# Patient Record
Sex: FEMALE | Race: WHITE | NOT HISPANIC OR LATINO | Employment: OTHER | URBAN - METROPOLITAN AREA
[De-identification: names, ages, dates, MRNs, and addresses within clinical notes are randomized per-mention and may not be internally consistent; named-entity substitution may affect disease eponyms.]

---

## 2021-11-13 ENCOUNTER — HOSPITAL ENCOUNTER (INPATIENT)
Facility: HOSPITAL | Age: 81
LOS: 4 days | Discharge: NURSING, REHAB OR LONG-TERM CARE | DRG: 419 | End: 2021-11-18
Attending: EMERGENCY MEDICINE | Admitting: HOSPITALIST
Payer: MEDICARE

## 2021-11-13 ENCOUNTER — ANCILLARY PROCEDURE (OUTPATIENT)
Dept: ULTRASOUND IMAGING | Facility: HOSPITAL | Age: 81
DRG: 419 | End: 2021-11-13
Attending: EMERGENCY MEDICINE
Payer: MEDICARE

## 2021-11-13 DIAGNOSIS — K81.0 ACUTE CHOLECYSTITIS: ICD-10-CM

## 2021-11-13 DIAGNOSIS — R52 PAIN: Primary | ICD-10-CM

## 2021-11-13 DIAGNOSIS — R10.11 RUQ ABDOMINAL PAIN: ICD-10-CM

## 2021-11-13 DIAGNOSIS — D72.829 LEUKOCYTOSIS, UNSPECIFIED TYPE: ICD-10-CM

## 2021-11-13 PROBLEM — F32.9 MAJOR DEPRESSION: Status: ACTIVE | Noted: 2021-11-13

## 2021-11-13 PROBLEM — E66.9 OBESITY: Status: ACTIVE | Noted: 2021-11-13

## 2021-11-13 PROBLEM — I26.99 OTHER ACUTE PULMONARY EMBOLISM WITHOUT ACUTE COR PULMONALE (H): Status: ACTIVE | Noted: 2017-08-02

## 2021-11-13 PROBLEM — R74.8 ABNORMAL AST AND ALT: Status: ACTIVE | Noted: 2021-04-20

## 2021-11-13 LAB
ALBUMIN SERPL-MCNC: 3.7 G/DL (ref 3.5–5)
ALBUMIN UR-MCNC: 10 MG/DL
ALP SERPL-CCNC: 76 U/L (ref 45–120)
ALT SERPL W P-5'-P-CCNC: 16 U/L (ref 0–45)
ANION GAP SERPL CALCULATED.3IONS-SCNC: 7 MMOL/L (ref 5–18)
APPEARANCE UR: CLEAR
AST SERPL W P-5'-P-CCNC: 17 U/L (ref 0–40)
ATRIAL RATE - MUSE: 73 BPM
BACTERIA #/AREA URNS HPF: ABNORMAL /HPF
BASOPHILS # BLD AUTO: 0 10E3/UL (ref 0–0.2)
BASOPHILS NFR BLD AUTO: 0 %
BILIRUB DIRECT SERPL-MCNC: 0.3 MG/DL
BILIRUB SERPL-MCNC: 0.8 MG/DL (ref 0–1)
BILIRUB UR QL STRIP: NEGATIVE
BUN SERPL-MCNC: 16 MG/DL (ref 8–28)
CALCIUM SERPL-MCNC: 9.5 MG/DL (ref 8.5–10.5)
CHLORIDE BLD-SCNC: 110 MMOL/L (ref 98–107)
CO2 SERPL-SCNC: 22 MMOL/L (ref 22–31)
COLOR UR AUTO: ABNORMAL
CREAT SERPL-MCNC: 0.82 MG/DL (ref 0.6–1.1)
DIASTOLIC BLOOD PRESSURE - MUSE: NORMAL MMHG
EOSINOPHIL # BLD AUTO: 0 10E3/UL (ref 0–0.7)
EOSINOPHIL NFR BLD AUTO: 0 %
ERYTHROCYTE [DISTWIDTH] IN BLOOD BY AUTOMATED COUNT: 12.8 % (ref 10–15)
GFR SERPL CREATININE-BSD FRML MDRD: 66 ML/MIN/1.73M2
GLUCOSE BLD-MCNC: 125 MG/DL (ref 70–125)
GLUCOSE UR STRIP-MCNC: NEGATIVE MG/DL
HCT VFR BLD AUTO: 44.9 % (ref 35–47)
HGB BLD-MCNC: 15 G/DL (ref 11.7–15.7)
HGB UR QL STRIP: ABNORMAL
IMM GRANULOCYTES # BLD: 0 10E3/UL
IMM GRANULOCYTES NFR BLD: 0 %
INR PPP: 1.35 (ref 0.85–1.15)
INTERPRETATION ECG - MUSE: NORMAL
KETONES UR STRIP-MCNC: 40 MG/DL
LEUKOCYTE ESTERASE UR QL STRIP: ABNORMAL
LIPASE SERPL-CCNC: <9 U/L (ref 0–52)
LYMPHOCYTES # BLD AUTO: 2.2 10E3/UL (ref 0.8–5.3)
LYMPHOCYTES NFR BLD AUTO: 18 %
MCH RBC QN AUTO: 32.5 PG (ref 26.5–33)
MCHC RBC AUTO-ENTMCNC: 33.4 G/DL (ref 31.5–36.5)
MCV RBC AUTO: 97 FL (ref 78–100)
MONOCYTES # BLD AUTO: 0.9 10E3/UL (ref 0–1.3)
MONOCYTES NFR BLD AUTO: 8 %
MUCOUS THREADS #/AREA URNS LPF: PRESENT /LPF
NEUTROPHILS # BLD AUTO: 9.2 10E3/UL (ref 1.6–8.3)
NEUTROPHILS NFR BLD AUTO: 74 %
NITRATE UR QL: NEGATIVE
NRBC # BLD AUTO: 0 10E3/UL
NRBC BLD AUTO-RTO: 0 /100
P AXIS - MUSE: 50 DEGREES
PH UR STRIP: 6.5 [PH] (ref 5–7)
PLATELET # BLD AUTO: 191 10E3/UL (ref 150–450)
POTASSIUM BLD-SCNC: 3.6 MMOL/L (ref 3.5–5)
PR INTERVAL - MUSE: 190 MS
PROT SERPL-MCNC: 6.7 G/DL (ref 6–8)
QRS DURATION - MUSE: 84 MS
QT - MUSE: 398 MS
QTC - MUSE: 438 MS
R AXIS - MUSE: -21 DEGREES
RBC # BLD AUTO: 4.62 10E6/UL (ref 3.8–5.2)
RBC URINE: 9 /HPF
SARS-COV-2 RNA RESP QL NAA+PROBE: NEGATIVE
SODIUM SERPL-SCNC: 139 MMOL/L (ref 136–145)
SP GR UR STRIP: 1.02 (ref 1–1.03)
SQUAMOUS EPITHELIAL: <1 /HPF
SYSTOLIC BLOOD PRESSURE - MUSE: NORMAL MMHG
T AXIS - MUSE: 26 DEGREES
TROPONIN I SERPL-MCNC: <0.01 NG/ML (ref 0–0.29)
UROBILINOGEN UR STRIP-MCNC: <2 MG/DL
VENTRICULAR RATE- MUSE: 73 BPM
WBC # BLD AUTO: 12.3 10E3/UL (ref 4–11)
WBC CLUMPS #/AREA URNS HPF: PRESENT /HPF
WBC URINE: 46 /HPF

## 2021-11-13 PROCEDURE — 80053 COMPREHEN METABOLIC PANEL: CPT | Performed by: EMERGENCY MEDICINE

## 2021-11-13 PROCEDURE — 96365 THER/PROPH/DIAG IV INF INIT: CPT

## 2021-11-13 PROCEDURE — 99220 PR INITIAL OBSERVATION CARE,LEVEL III: CPT | Performed by: HOSPITALIST

## 2021-11-13 PROCEDURE — 96375 TX/PRO/DX INJ NEW DRUG ADDON: CPT

## 2021-11-13 PROCEDURE — 81001 URINALYSIS AUTO W/SCOPE: CPT | Performed by: EMERGENCY MEDICINE

## 2021-11-13 PROCEDURE — 250N000011 HC RX IP 250 OP 636: Performed by: HOSPITALIST

## 2021-11-13 PROCEDURE — 87086 URINE CULTURE/COLONY COUNT: CPT | Performed by: EMERGENCY MEDICINE

## 2021-11-13 PROCEDURE — 258N000003 HC RX IP 258 OP 636: Performed by: EMERGENCY MEDICINE

## 2021-11-13 PROCEDURE — 96361 HYDRATE IV INFUSION ADD-ON: CPT

## 2021-11-13 PROCEDURE — 76705 ECHO EXAM OF ABDOMEN: CPT

## 2021-11-13 PROCEDURE — 250N000011 HC RX IP 250 OP 636: Performed by: EMERGENCY MEDICINE

## 2021-11-13 PROCEDURE — 83690 ASSAY OF LIPASE: CPT | Performed by: EMERGENCY MEDICINE

## 2021-11-13 PROCEDURE — 87635 SARS-COV-2 COVID-19 AMP PRB: CPT | Performed by: EMERGENCY MEDICINE

## 2021-11-13 PROCEDURE — 36415 COLL VENOUS BLD VENIPUNCTURE: CPT | Performed by: EMERGENCY MEDICINE

## 2021-11-13 PROCEDURE — 84484 ASSAY OF TROPONIN QUANT: CPT | Performed by: EMERGENCY MEDICINE

## 2021-11-13 PROCEDURE — 85610 PROTHROMBIN TIME: CPT | Performed by: EMERGENCY MEDICINE

## 2021-11-13 PROCEDURE — C9803 HOPD COVID-19 SPEC COLLECT: HCPCS

## 2021-11-13 PROCEDURE — 99285 EMERGENCY DEPT VISIT HI MDM: CPT | Mod: 25

## 2021-11-13 PROCEDURE — 85025 COMPLETE CBC W/AUTO DIFF WBC: CPT | Performed by: EMERGENCY MEDICINE

## 2021-11-13 PROCEDURE — 250N000011 HC RX IP 250 OP 636

## 2021-11-13 PROCEDURE — 93005 ELECTROCARDIOGRAM TRACING: CPT | Performed by: EMERGENCY MEDICINE

## 2021-11-13 PROCEDURE — G0378 HOSPITAL OBSERVATION PER HR: HCPCS

## 2021-11-13 RX ORDER — MORPHINE SULFATE 4 MG/ML
4 INJECTION, SOLUTION INTRAMUSCULAR; INTRAVENOUS ONCE
Status: COMPLETED | OUTPATIENT
Start: 2021-11-13 | End: 2021-11-13

## 2021-11-13 RX ORDER — PIPERACILLIN SODIUM, TAZOBACTAM SODIUM 3; .375 G/15ML; G/15ML
3.38 INJECTION, POWDER, LYOPHILIZED, FOR SOLUTION INTRAVENOUS ONCE
Status: COMPLETED | OUTPATIENT
Start: 2021-11-13 | End: 2021-11-13

## 2021-11-13 RX ORDER — ONDANSETRON 4 MG/1
4 TABLET, ORALLY DISINTEGRATING ORAL EVERY 6 HOURS PRN
Status: DISCONTINUED | OUTPATIENT
Start: 2021-11-13 | End: 2021-11-15

## 2021-11-13 RX ORDER — BUPROPION HYDROCHLORIDE 150 MG/1
300 TABLET ORAL EVERY MORNING
COMMUNITY

## 2021-11-13 RX ORDER — CETIRIZINE HYDROCHLORIDE 10 MG/1
10 TABLET ORAL DAILY PRN
COMMUNITY

## 2021-11-13 RX ORDER — ACETAMINOPHEN 500 MG
500-1000 TABLET ORAL EVERY 6 HOURS PRN
Status: ON HOLD | COMMUNITY
End: 2021-11-18

## 2021-11-13 RX ORDER — PIPERACILLIN SODIUM, TAZOBACTAM SODIUM 3; .375 G/15ML; G/15ML
3.38 INJECTION, POWDER, LYOPHILIZED, FOR SOLUTION INTRAVENOUS EVERY 8 HOURS
Status: DISCONTINUED | OUTPATIENT
Start: 2021-11-13 | End: 2021-11-17

## 2021-11-13 RX ORDER — BISACODYL 10 MG
10 SUPPOSITORY, RECTAL RECTAL DAILY PRN
Status: DISCONTINUED | OUTPATIENT
Start: 2021-11-13 | End: 2021-11-15

## 2021-11-13 RX ORDER — TOPIRAMATE 50 MG/1
50 TABLET, FILM COATED ORAL 2 TIMES DAILY
COMMUNITY

## 2021-11-13 RX ORDER — HYDRALAZINE HYDROCHLORIDE 20 MG/ML
5 INJECTION INTRAMUSCULAR; INTRAVENOUS EVERY 6 HOURS PRN
Status: DISCONTINUED | OUTPATIENT
Start: 2021-11-13 | End: 2021-11-18 | Stop reason: HOSPADM

## 2021-11-13 RX ORDER — DOCUSATE SODIUM 100 MG/1
100 CAPSULE, LIQUID FILLED ORAL 2 TIMES DAILY PRN
Status: DISCONTINUED | OUTPATIENT
Start: 2021-11-13 | End: 2021-11-18 | Stop reason: HOSPADM

## 2021-11-13 RX ORDER — ACETAMINOPHEN 325 MG/1
650 TABLET ORAL EVERY 6 HOURS PRN
Status: DISCONTINUED | OUTPATIENT
Start: 2021-11-13 | End: 2021-11-15

## 2021-11-13 RX ORDER — MORPHINE SULFATE 2 MG/ML
1 INJECTION, SOLUTION INTRAMUSCULAR; INTRAVENOUS ONCE
Status: COMPLETED | OUTPATIENT
Start: 2021-11-13 | End: 2021-11-13

## 2021-11-13 RX ORDER — CARBOXYMETHYLCELLULOSE SODIUM 5 MG/ML
2 SOLUTION/ DROPS OPHTHALMIC 4 TIMES DAILY PRN
COMMUNITY

## 2021-11-13 RX ORDER — ONDANSETRON 2 MG/ML
4 INJECTION INTRAMUSCULAR; INTRAVENOUS EVERY 6 HOURS PRN
Status: DISCONTINUED | OUTPATIENT
Start: 2021-11-13 | End: 2021-11-15

## 2021-11-13 RX ORDER — VIT A/VIT C/VIT E/ZINC/COPPER 2148-113
1 TABLET ORAL 2 TIMES DAILY
COMMUNITY

## 2021-11-13 RX ADMIN — PIPERACILLIN SODIUM AND TAZOBACTAM SODIUM 3.38 G: 3; .375 INJECTION, POWDER, LYOPHILIZED, FOR SOLUTION INTRAVENOUS at 16:03

## 2021-11-13 RX ADMIN — ONDANSETRON 4 MG: 2 INJECTION INTRAMUSCULAR; INTRAVENOUS at 19:47

## 2021-11-13 RX ADMIN — SODIUM CHLORIDE 1000 ML: 9 INJECTION, SOLUTION INTRAVENOUS at 16:07

## 2021-11-13 RX ADMIN — MORPHINE SULFATE 4 MG: 4 INJECTION INTRAVENOUS at 14:30

## 2021-11-13 RX ADMIN — MORPHINE SULFATE 1 MG: 2 INJECTION, SOLUTION INTRAMUSCULAR; INTRAVENOUS at 21:54

## 2021-11-13 RX ADMIN — PIPERACILLIN SODIUM AND TAZOBACTAM SODIUM 3.38 G: 3; .375 INJECTION, POWDER, LYOPHILIZED, FOR SOLUTION INTRAVENOUS at 21:14

## 2021-11-13 ASSESSMENT — ACTIVITIES OF DAILY LIVING (ADL)
HEARING_DIFFICULTY_OR_DEAF: NO
FALL_HISTORY_WITHIN_LAST_SIX_MONTHS: NO
WEAR_GLASSES_OR_BLIND: YES
CONCENTRATING,_REMEMBERING_OR_MAKING_DECISIONS_DIFFICULTY: NO
TOILETING_ISSUES: NO
DIFFICULTY_EATING/SWALLOWING: NO
DIFFICULTY_COMMUNICATING: NO
VISION_MANAGEMENT: VISION
DRESSING/BATHING_DIFFICULTY: NO
DOING_ERRANDS_INDEPENDENTLY_DIFFICULTY: YES
EQUIPMENT_CURRENTLY_USED_AT_HOME: CANE, STRAIGHT
WALKING_OR_CLIMBING_STAIRS_DIFFICULTY: NO
DEPENDENT_IADLS:: TRANSPORTATION

## 2021-11-13 ASSESSMENT — ENCOUNTER SYMPTOMS
CONSTIPATION: 1
ABDOMINAL PAIN: 1

## 2021-11-13 NOTE — ED PROVIDER NOTES
ED Triage Provider Note  [facility]  Encounter Date: Nov 13, 2021    History:  Chief Complaint   Patient presents with     Abdominal Pain     Nelly Alaniz is a 81 year old female who presents to the ED with non-radiating middle abdominal pain. EMS reported no bowel movement since Thursday (11/11). Patient was given 8 Zofran while in transit for pain.     Review of Systems:  Review of Systems   Gastrointestinal: Positive for abdominal pain (middle) and constipation (last bowel movement 11/11).   All other systems reviewed and are negative.       Exam:  BP (!) 173/81   Pulse 77   Temp 98.6  F (37  C) (Oral)   Resp 13   SpO2 98%   General: No acute distress. Appears stated age.   Cardio: Regular rate, extremities well perfused  Resp: Normal work of breathing, grossly normal respiratory rate  Neuro: Alert. CN II-XII grossly intact. Grossly intact strength.   Tenderness to palpation throughout the upper abdomen.  Medical Decision Making:  Patient arriving to the ED with problem as above. A medical screening exam was performed. orders initiated from Triage. The patient room as soon as 1 is available.  Ada Soto on 11/13/2021 at 1:21 PM      Lab/Imaging Results:  Results for orders placed or performed during the hospital encounter of 11/13/21   Abdomen US, limited (RUQ only)    Impression    IMPRESSION:  1.  Acute cholecystitis.       Basic metabolic panel   Result Value Ref Range    Sodium 139 136 - 145 mmol/L    Potassium 3.6 3.5 - 5.0 mmol/L    Chloride 110 (H) 98 - 107 mmol/L    Carbon Dioxide (CO2) 22 22 - 31 mmol/L    Anion Gap 7 5 - 18 mmol/L    Urea Nitrogen 16 8 - 28 mg/dL    Creatinine 0.82 0.60 - 1.10 mg/dL    Calcium 9.5 8.5 - 10.5 mg/dL    Glucose 125 70 - 125 mg/dL    GFR Estimate 66 >60 mL/min/1.73m2   Hepatic function panel   Result Value Ref Range    Bilirubin Total 0.8 0.0 - 1.0 mg/dL    Bilirubin Direct 0.3 <=0.5 mg/dL    Protein Total 6.7 6.0 - 8.0 g/dL    Albumin 3.7  3.5 - 5.0 g/dL    Alkaline Phosphatase 76 45 - 120 U/L    AST 17 0 - 40 U/L    ALT 16 0 - 45 U/L   Troponin I (now)   Result Value Ref Range    Troponin I <0.01 0.00 - 0.29 ng/mL   Result Value Ref Range    Lipase <9 0 - 52 U/L   CBC with platelets and differential   Result Value Ref Range    WBC Count 12.3 (H) 4.0 - 11.0 10e3/uL    RBC Count 4.62 3.80 - 5.20 10e6/uL    Hemoglobin 15.0 11.7 - 15.7 g/dL    Hematocrit 44.9 35.0 - 47.0 %    MCV 97 78 - 100 fL    MCH 32.5 26.5 - 33.0 pg    MCHC 33.4 31.5 - 36.5 g/dL    RDW 12.8 10.0 - 15.0 %    Platelet Count 191 150 - 450 10e3/uL    % Neutrophils 74 %    % Lymphocytes 18 %    % Monocytes 8 %    % Eosinophils 0 %    % Basophils 0 %    % Immature Granulocytes 0 %    NRBCs per 100 WBC 0 <1 /100    Absolute Neutrophils 9.2 (H) 1.6 - 8.3 10e3/uL    Absolute Lymphocytes 2.2 0.8 - 5.3 10e3/uL    Absolute Monocytes 0.9 0.0 - 1.3 10e3/uL    Absolute Eosinophils 0.0 0.0 - 0.7 10e3/uL    Absolute Basophils 0.0 0.0 - 0.2 10e3/uL    Absolute Immature Granulocytes 0.0 <=0.0 10e3/uL    Absolute NRBCs 0.0 10e3/uL   Result Value Ref Range    INR 1.35 (H) 0.85 - 1.15       Interventions:  Medications   piperacillin-tazobactam (ZOSYN) 3.375 g vial to attach to  mL bag (3.375 g Intravenous New Bag 11/13/21 1603)   morphine (PF) injection 4 mg (4 mg Intravenous Given 11/13/21 1430)   0.9% sodium chloride BOLUS (1,000 mLs Intravenous New Bag 11/13/21 1607)       Diagnosis:  1. Acute cholecystitis    2. RUQ abdominal pain    3. Leukocytosis, unspecified type        I, Dallas Miles, am serving as a scribe to documentservices personally performed by Dr. Soto based on my observation and the provider's statements to me. I, Ada Soto MD attest that Dallas Miles  is acting in a scribe capacity, has observed my performance of the services andhas documented them in accordance with my direction.    Ada Soto MD  Emergency Medicine  [facility]  EMERGENCY DEPARTMENT         Ada Soto MD  11/13/21 8333

## 2021-11-13 NOTE — PHARMACY-ADMISSION MEDICATION HISTORY
Pharmacy Note - Admission Medication History    Pertinent Provider Information: none     ______________________________________________________________________    Prior To Admission (PTA) med list completed and updated in EMR.       PTA Med List   Medication Sig Last Dose     acetaminophen (TYLENOL) 500 MG tablet Take 500-1,000 mg by mouth every 6 hours as needed for mild pain Unknown at Unknown time     buPROPion (WELLBUTRIN XL) 150 MG 24 hr tablet Take 300 mg by mouth every morning 11/12/2021 at Unknown time     calcium carbonate 600 mg-vitamin D 400 units (CALTRATE) 600-400 MG-UNIT per tablet Take 2 tablets by mouth daily 11/12/2021 at Unknown time     carboxymethylcellulose PF (REFRESH PLUS) 0.5 % ophthalmic solution Place 2 drops into both eyes 4 times daily as needed for dry eyes Unknown at Unknown time     cetirizine (ZYRTEC) 10 MG tablet Take 10 mg by mouth daily as needed for allergies Unknown at Unknown time     Multiple Vitamins-Minerals (PRESERVISION AREDS) TABS Take 1 tablet by mouth 2 times daily 11/12/2021 at Unknown time     rivaroxaban ANTICOAGULANT (XARELTO) 20 MG TABS tablet Take 20 mg by mouth daily (with dinner) 11/12/2021 at Unknown time     topiramate (TOPAMAX) 50 MG tablet Take 50 mg by mouth 2 times daily 11/12/2021 at Unknown time       Information source(s): Patient, Clinic records and [facility]  Method of interview communication: in-person    Summary of Changes to PTA Med List  New: none  Discontinued: none  Changed: none    Patient was asked about OTC/herbal products specifically.  PTA med list reflects this.    In the past week, patient estimated taking medication this percent of the time:  50-90% due to illness.    Allergies were reviewed, assessed, and updated with the patient.      Patient did not bring any medications to the hospital and can't retrieve from home. No multi-dose medications are available for use during hospital stay.     The information provided in  this note is only as accurate as the sources available at the time of the update(s).    Thank you for the opportunity to participate in the care of this patient.    Hussein Bowser RP  11/13/2021 5:02 PM

## 2021-11-13 NOTE — ED TRIAGE NOTES
Patient presents here via [facility], crew #[id number] here for evaluation of upper abdominal pain. She notes that she is constipated and has not had a bowel movement for about three days. She describes her pain as crampy and intensity increases and decreases. She has an IV established by medics and received Zofran 8mg IV by medics when en route.

## 2021-11-13 NOTE — ED PROVIDER NOTES
EMERGENCY DEPARTMENT ENCOUNTER      NAME: Nelly Alaniz  AGE: 81 year old female  YOB: 1940  MRN: [id number]  EVALUATION DATE & TIME: 11/13/2021  2:05 PM    PCP: No primary care provider on file.    ED PROVIDER: Soha Key MD    Chief Complaint   Patient presents with     Abdominal Pain       FINAL IMPRESSION:  1. Acute cholecystitis    2. RUQ abdominal pain    3. Leukocytosis, unspecified type          ED COURSE & MEDICAL DECISION MAKING:    Pertinent Labs & Imaging studies reviewed. 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  Pain is reproducible with right upper quadrant abdominal pain on exam greatest at Barr's point.  Nausea is improved after Zofran in route.  Overall strong concern for hepatobiliary pathology, symptomatic cholelithiasis, cholecystitis, pancreatitis.  Less likely appendicitis, UTI/pyelonephritis, ureterolithiasis.  Patient does not have any associated chest pain, shortness of breath to suspect recurrent/breakthrough PE, ACS, basilar pneumonia.    Patient placed on monitor, IV established and blood obtained.  Nursing did obtain a twelve-lead EKG showing sinus rhythm without ischemic changes.  Patient given 4 mg morphine.  Bedside ultrasound performed, please see procedure note.  There is stone versus sludge in the gallbladder and positive sonographic Barr sign.  CBC, BMP, LFTs, lipase, troponin notable for WBC of 12.3.  INR 1.35, again she is on Xarelto.  Formal right upper quadrant ultrasound shows cute cholecystitis.  Patient made n.p.o., given Zosyn.  General surgery consulted and will be admitted to medicine for further operative management.       ED Course as of 11/13/21 1547   Sat Nov 13, 2021   1423 I met with the patient for the initial interview and physical examination. Discussed plan for  treatment and workup in the ED.       1454 WBC(!): 12.3   1520 Updated pt on US result/plan for admit          At the conclusion of the encounter I discussed the results of all of the tests and the disposition. The questions were answered. The patient or family acknowledged understanding and was agreeable with the care plan.    CONSULTS:  gen surg       MEDICATIONS GIVEN IN THE EMERGENCY:  Medications   piperacillin-tazobactam (ZOSYN) 3.375 g vial to attach to  mL bag (has no administration in time range)   morphine (PF) injection 4 mg (4 mg Intravenous Given 11/13/21 1430)     NEW PRESCRIPTIONS STARTED AT TODAY'S ER VISIT  New Prescriptions    No medications on file     =================================================================    HPI    Patient information was obtained from: Patient    Use of Intrepreter: N/A        Nelly Alaniz is a 81 year old female with pertinent medical history of PE, obesity, and long term anticoagulation who presents to the ED via EMS for evaluation of abdominal pain.     Patient is a poor historian and is lying in bed moaning in pain, not answering some questions.     The patient reports onset of abdominal pain Thursday (11/11) evening that resolved last night and returned again this morning. She describes the pain as cramping. She also endorses nausea. Notes a history of PE and hysterectomy. Patient is currently on Xarelto. Patient denies vomiting, chest pain, shortness of breath, urinary symptoms, and any other symptoms or complaints at this time.     Notes constipation last BM 3 days ago.  Previous abdominal surgeries include hysterectomy.  Was given 8 mg Zofran per EMS with improvement of her nausea.      REVIEW OF SYSTEMS  Constitutional:  Denies fever, chills, weight loss or weakness  Respiratory: No SOB, wheeze or cough  Cardiovascular:  No CP, palpitations  GI:  Denies vomiting, diarrhea  Positive for abdominal pain, nausea  : Denies dysuria, denies  hematuria  Musculoskeletal:  Denies any new muscle/joint pain, swelling or loss of function.  All other systems negative unless noted in HPI.      PAST MEDICAL HISTORY:  Past Medical History:   Diagnosis Date     Depressive disorder      Obesity      Pulmonary emboli (H)        PAST SURGICAL HISTORY:  Past Surgical History:   Procedure Laterality Date     HYSTERECTOMY         CURRENT MEDICATIONS:    None       ALLERGIES:  No Known Allergies    FAMILY HISTORY:  History reviewed. No pertinent family history.    SOCIAL HISTORY:  Social History     Tobacco Use     Smoking status: None     Smokeless tobacco: None   Substance Use Topics     Alcohol use: None     Drug use: None        VITALS:  Patient Vitals for the past 24 hrs:   BP Temp Temp src Pulse Resp SpO2   11/13/21 1447 -- -- -- -- -- 98 %   11/13/21 1445 (!) 173/81 -- -- 77 13 93 %   11/13/21 1322 (!) 194/90 98.6  F (37  C) Oral 75 18 98 %       PHYSICAL EXAM    General Appearance: uncomfortable appearing, not a good historian as she lies in bed moaning and not answering some questions.   Head:  Normocephalic  Eyes:   conjunctiva/corneas clear  ENT:  membranes are moist without pallor  Neck:  Supple  Cardio:  Regular rate and rhythm, no murmur/gallop/rub, hypertensive normalized on recheck  Pulm:  No respiratory distress, clear to auscultation bilaterally  Back:  No CVA tenderness, normal ROM  Abdomen:  Soft, obese, RUQ tenderness, non distended,no rebound or guarding.  Extremities: Moves all extremities normally.  No peripheral edema  Skin:  Skin warm, dry, no rashes  Neuro:  Alert and oriented ×3, moving all extremities, no gross sensory defects       RADIOLOGY/LABS:  Reviewed all pertinent imaging. Please see official radiology report. All pertinent labs reviewed and interpreted.    Results for orders placed or performed during the hospital encounter of 11/13/21   Abdomen US, limited (RUQ only)    Impression    IMPRESSION:  1.  Acute cholecystitis.       Basic  metabolic panel   Result Value Ref Range    Sodium 139 136 - 145 mmol/L    Potassium 3.6 3.5 - 5.0 mmol/L    Chloride 110 (H) 98 - 107 mmol/L    Carbon Dioxide (CO2) 22 22 - 31 mmol/L    Anion Gap 7 5 - 18 mmol/L    Urea Nitrogen 16 8 - 28 mg/dL    Creatinine 0.82 0.60 - 1.10 mg/dL    Calcium 9.5 8.5 - 10.5 mg/dL    Glucose 125 70 - 125 mg/dL    GFR Estimate 66 >60 mL/min/1.73m2   Hepatic function panel   Result Value Ref Range    Bilirubin Total 0.8 0.0 - 1.0 mg/dL    Bilirubin Direct 0.3 <=0.5 mg/dL    Protein Total 6.7 6.0 - 8.0 g/dL    Albumin 3.7 3.5 - 5.0 g/dL    Alkaline Phosphatase 76 45 - 120 U/L    AST 17 0 - 40 U/L    ALT 16 0 - 45 U/L   Troponin I (now)   Result Value Ref Range    Troponin I <0.01 0.00 - 0.29 ng/mL   Result Value Ref Range    Lipase <9 0 - 52 U/L   CBC with platelets and differential   Result Value Ref Range    WBC Count 12.3 (H) 4.0 - 11.0 10e3/uL    RBC Count 4.62 3.80 - 5.20 10e6/uL    Hemoglobin 15.0 11.7 - 15.7 g/dL    Hematocrit 44.9 35.0 - 47.0 %    MCV 97 78 - 100 fL    MCH 32.5 26.5 - 33.0 pg    MCHC 33.4 31.5 - 36.5 g/dL    RDW 12.8 10.0 - 15.0 %    Platelet Count 191 150 - 450 10e3/uL    % Neutrophils 74 %    % Lymphocytes 18 %    % Monocytes 8 %    % Eosinophils 0 %    % Basophils 0 %    % Immature Granulocytes 0 %    NRBCs per 100 WBC 0 <1 /100    Absolute Neutrophils 9.2 (H) 1.6 - 8.3 10e3/uL    Absolute Lymphocytes 2.2 0.8 - 5.3 10e3/uL    Absolute Monocytes 0.9 0.0 - 1.3 10e3/uL    Absolute Eosinophils 0.0 0.0 - 0.7 10e3/uL    Absolute Basophils 0.0 0.0 - 0.2 10e3/uL    Absolute Immature Granulocytes 0.0 <=0.0 10e3/uL    Absolute NRBCs 0.0 10e3/uL   Result Value Ref Range    INR 1.35 (H) 0.85 - 1.15       EKG:  Performed at: 13-NOV-2021 14:26:59    Impression: Normal sinus rhythm. Normal ECG.     Rate: 73 bpm  Rhythm: Sinus  Axis: -21  SD Interval: 190 ms  QRS Interval: 84 ms  QTc Interval: 438 ms    Comparison: No previous available for comparison.   I have independently  reviewed and interpreted the EKG(s) documented above.    PROCEDURES:    PROCEDURE: Emergency Department Limited Bedside Screening Ultrasound   ANATOMICAL WINDOW:  Right upper quadrant   INDICATIONS:  Right upper quadrant abdominal pain   PROCEDURE PROVIDER: Dr. Key   FINDINGS:  Curvilinear probe used in the right upper quadrant.  Patient has sludge versus sludge and stone in the gallbladder with positive sonographic Barr sign.  I do not visualize any pericholecystic fluid or gallbladder wall thickening.   IMAGES PRINTED & SCANNED OR SAVED TO MEMORY: YES       The creation of this record is based on the scribe s observations of the work being performed by Soha Key MD and the provider s statements to them. It was created on his behalf by Liudmila Kilgore, a trained medical scribe. This document has been checked and approved by the attending provider.    Soha Key MD  Emergency Medicine  [facility] EMERGENCY DEPARTMENT  [address] [place] [id number] [phone]  Dept: [phone]      Soha Key MD  11/13/21 5638

## 2021-11-13 NOTE — ED NOTES
[facility] ED Handoff Report    ED Chief Complaint: abdominal pain    ED Diagnosis:  (K81.0) Acute cholecystitis  Comment:   Plan: surgery consult    (R10.11) RUQ abdominal pain  Comment:   Plan:     (D72.829) Leukocytosis, unspecified type  Comment:   Plan:        PMH:    Past Medical History:   Diagnosis Date     Depressive disorder      Obesity      Pulmonary emboli (H)         Code Status:  No Order   Falls risk yes Band: Applied      Elimination Status: continent, stand by assist with commode     Activity Level: stand by assist    Patients Preferred Language:  English     Needed:no    Vital Signs:  BP (!) 173/81   Pulse 77   Temp 98.6  F (37  C) (Oral)   Resp 13   SpO2 98%      Cardiac Rhythm: nsr    Pain Score: 4/10    Is the Patient Confused: no    Last Food or Drink: unknown    Focused Assessment: right abdominal pain    Tests Performed: labs, ultrasound    Treatments Provided:  Antibiotics, pain medications    Family Dynamics/Concerns: no            covid negative  Additional Information: patient has flat affect, answers questions appropriately while in ED and verbalizes needs appropriately she is NPO status.

## 2021-11-14 PROBLEM — D72.829 LEUKOCYTOSIS, UNSPECIFIED TYPE: Status: ACTIVE | Noted: 2021-11-14

## 2021-11-14 PROBLEM — R10.11 RUQ ABDOMINAL PAIN: Status: ACTIVE | Noted: 2021-11-14

## 2021-11-14 PROCEDURE — 250N000013 HC RX MED GY IP 250 OP 250 PS 637: Performed by: HOSPITALIST

## 2021-11-14 PROCEDURE — 96375 TX/PRO/DX INJ NEW DRUG ADDON: CPT

## 2021-11-14 PROCEDURE — G0378 HOSPITAL OBSERVATION PER HR: HCPCS

## 2021-11-14 PROCEDURE — 99207 PR NO CHARGE LOS: CPT | Performed by: PHYSICIAN ASSISTANT

## 2021-11-14 PROCEDURE — 96376 TX/PRO/DX INJ SAME DRUG ADON: CPT

## 2021-11-14 PROCEDURE — 99231 SBSQ HOSP IP/OBS SF/LOW 25: CPT | Performed by: HOSPITALIST

## 2021-11-14 PROCEDURE — 250N000013 HC RX MED GY IP 250 OP 250 PS 637

## 2021-11-14 PROCEDURE — 99222 1ST HOSP IP/OBS MODERATE 55: CPT | Mod: 57 | Performed by: SURGERY

## 2021-11-14 PROCEDURE — 250N000011 HC RX IP 250 OP 636: Performed by: STUDENT IN AN ORGANIZED HEALTH CARE EDUCATION/TRAINING PROGRAM

## 2021-11-14 PROCEDURE — 120N000001 HC R&B MED SURG/OB

## 2021-11-14 PROCEDURE — 250N000011 HC RX IP 250 OP 636: Performed by: HOSPITALIST

## 2021-11-14 RX ORDER — NALOXONE HYDROCHLORIDE 0.4 MG/ML
0.4 INJECTION, SOLUTION INTRAMUSCULAR; INTRAVENOUS; SUBCUTANEOUS
Status: DISCONTINUED | OUTPATIENT
Start: 2021-11-14 | End: 2021-11-18 | Stop reason: HOSPADM

## 2021-11-14 RX ORDER — NALOXONE HYDROCHLORIDE 0.4 MG/ML
0.2 INJECTION, SOLUTION INTRAMUSCULAR; INTRAVENOUS; SUBCUTANEOUS
Status: DISCONTINUED | OUTPATIENT
Start: 2021-11-14 | End: 2021-11-18 | Stop reason: HOSPADM

## 2021-11-14 RX ORDER — HYDROMORPHONE HCL IN WATER/PF 6 MG/30 ML
.2-.4 PATIENT CONTROLLED ANALGESIA SYRINGE INTRAVENOUS EVERY 6 HOURS PRN
Status: DISCONTINUED | OUTPATIENT
Start: 2021-11-14 | End: 2021-11-15

## 2021-11-14 RX ADMIN — PIPERACILLIN SODIUM AND TAZOBACTAM SODIUM 3.38 G: 3; .375 INJECTION, POWDER, LYOPHILIZED, FOR SOLUTION INTRAVENOUS at 05:06

## 2021-11-14 RX ADMIN — PIPERACILLIN SODIUM AND TAZOBACTAM SODIUM 3.38 G: 3; .375 INJECTION, POWDER, LYOPHILIZED, FOR SOLUTION INTRAVENOUS at 15:06

## 2021-11-14 RX ADMIN — HYDROMORPHONE HYDROCHLORIDE 0.4 MG: 0.2 INJECTION, SOLUTION INTRAMUSCULAR; INTRAVENOUS; SUBCUTANEOUS at 22:33

## 2021-11-14 RX ADMIN — ACETAMINOPHEN 650 MG: 325 TABLET ORAL at 00:21

## 2021-11-14 RX ADMIN — HYDROMORPHONE HYDROCHLORIDE 0.4 MG: 0.2 INJECTION, SOLUTION INTRAMUSCULAR; INTRAVENOUS; SUBCUTANEOUS at 08:25

## 2021-11-14 RX ADMIN — ACETAMINOPHEN 650 MG: 325 TABLET ORAL at 15:10

## 2021-11-14 RX ADMIN — ACETAMINOPHEN 650 MG: 325 TABLET ORAL at 22:20

## 2021-11-14 RX ADMIN — PIPERACILLIN SODIUM AND TAZOBACTAM SODIUM 3.38 G: 3; .375 INJECTION, POWDER, LYOPHILIZED, FOR SOLUTION INTRAVENOUS at 22:12

## 2021-11-14 RX ADMIN — Medication 1 MG: at 00:21

## 2021-11-14 ASSESSMENT — ACTIVITIES OF DAILY LIVING (ADL)
ADLS_ACUITY_SCORE: 9
ADLS_ACUITY_SCORE: 11

## 2021-11-14 NOTE — CONSULTS
Care Management Initial Consult    General Information  Assessment completed with: Patient, pt  Type of CM/SW Visit: Initial Assessment    Primary Care Provider verified and updated as needed: Yes   Readmission within the last 30 days: no previous admission in last 30 days   Return Category: Progression of disease  Reason for Consult: discharge planning  Advance Care Planning: Advance Care Planning Reviewed: no concerns identified  requested copy  General Information Comments: lives alone    Communication Assessment  Patient's communication style: spoken language (English or Bilingual)    Hearing Difficulty or Deaf: no   Wear Glasses or Blind: yes    Cognitive  Cognitive/Neuro/Behavioral: WDL                      Living Environment:   People in home: alone     Current living Arrangements: house      Able to return to prior arrangements: yes       Family/Social Support:  Care provided by: self  Provides care for: no one  Marital Status:   Children          Description of Support System: Supportive    Support Assessment: Adequate family and caregiver support    Current Resources:   Patient receiving home care services: No     Community Resources: DME  Equipment currently used at home: cane, straight  Supplies currently used at home: None    Employment/Financial:  Employment Status:          Financial Concerns: No concerns identified   Referral to Financial Counselor: No       Lifestyle & Psychosocial Needs:  Social Determinants of Health     Tobacco Use: Unknown     Smoking Tobacco Use: Never Smoker     Smokeless Tobacco Use: Unknown   Alcohol Use: Not on file   Financial Resource Strain: Not on file   Food Insecurity: Not on file   Transportation Needs: Not on file   Physical Activity: Not on file   Stress: Not on file   Social Connections: Not on file   Intimate Partner Violence: Not on file   Depression: Not on file   Housing Stability: Not on file       Functional Status:  Prior to admission patient needed  assistance:   Dependent ADLs:: Ambulation-cane,Ambulation-walker  Dependent IADLs:: Transportation  Assesssment of Functional Status: Not at baseline with ADL Functioning    Mental Health Status:  Mental Health Status: No Current Concerns       Chemical Dependency Status:                Values/Beliefs:  Spiritual, Cultural Beliefs, Voodoo Practices, Values that affect care:                 Additional Information:  GRETCHEN assessed, lives alone and daughter in laws are helpful and will transport at discharge. No other svcs and discussed COLLINS.      Devika Hughes RN

## 2021-11-14 NOTE — PLAN OF CARE
PRIMARY DIAGNOSIS: BILIARY COLIC/UNCOMPLICATED EARLY ACUTE CHOLECYSTITIS  OUTPATIENT/OBSERVATION GOALS TO BE MET BEFORE DISCHARGE:    1. Pain status: Improved-controlled with oral pain medications. And heat pack. New order for iv medications not needed at this time   .  2. Stable vital signs and labs (if performed) at disposition: yes  3. Tolerating adequate PO diet: No pt NPO    4. Successful cholecystectomy or clear follow up plan with General Surgery team if immediate surgery not performed No surgery to see.    5. ADLs back to baseline?  Yes  6. Activity and level of assistance: Up with standby assistance.  7. Barriers to discharge noted No    Discharge Planner Nurse   Safe discharge environment identified: Yes  Barriers to discharge: Yes       Entered by: Bessie Gomes 11/14/2021 5:45 AM     Please review provider order for any additional goals.   Nurse to notify provider when observation goals have been met and patient is ready for discharge.

## 2021-11-14 NOTE — PLAN OF CARE
Problem: Pain Acute  Goal: Acceptable Pain Control and Functional Ability  Outcome: Improving   Pt is alert and oriented x4. Made her needs known. C/o abdominal pain. Prn morphine given and was effective.

## 2021-11-14 NOTE — PROGRESS NOTES
[facility]    Medicine Progress Note - Hospitalist Service       Date of Admission:  11/13/2021    Assessment & Plan           Nelly Alaniz is a 81 year old female admitted on 11/13/2021. 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 Surgeons would like her off of anticoagulation for >48 hours.  Clear liquids and NPO after midnight  Pain meds as needed  Zosyn     #Preop exam  Tolerated surgery in the past without complication per patient  Average risk for surgery  Okay to proceed with urgent surgery without further evaluation     #History of DVT/PE  SCDs  Hold home Xarelto for surgery as above     #Mood disorder  Home meds  She notes that anxiety meds sometimes paradoxically make her more anxious     #Elevated blood pressure without diagnosis of hypertension  Probably due to pain, anxiety  Monitor     #MONIKA, not on CPAP       Diet: Clear Liquid Diet    DVT Prophylaxis: Moderate risk. SCDs   Valle Catheter: Not present  Central Lines: None  Code Status: No CPR- Do NOT Intubate      Disposition Plan   Disposition: Home when ready, likely 2 days  Discharge barriers: surgery tomorrow  Medically ready to discharge today: No  Estimated discharge date: 11/16/2021     The patient's care was discussed with the Patient and Patient's Family.    Urvashi Egan MD  Hospitalist Service  [facility]  Text page via [facility] Paging/Directory      Clinically Significant Risk Factors Present on Admission             # Coagulation Defect: home medication list includes an anticoagulant medication       ____________        Physical Exam   Vital Signs: Temp: 99.9  F (37.7  C) Temp src: Oral BP: 136/61 Pulse: 74   Resp: 20  SpO2: 95 % O2 Device: None (Room air)    Weight: 0 lbs 0 oz  General: in no apparent distress, non-toxic and fatigued appearing female lying in hospital bed oriented x3  HEENT: Head normocephalic atraumatic, oral mucosa moist. Sclerae anicteric  Skin: No rashes or lesions  Extremities: 2+ pitting edema bilateral ankles, wearing compression stockings  Psych: Normal affect, mood mildly dysthymic  Neuro: CNII-XII grossly intact, moving all 4 extremities    Data   Recent Results (from the past 24 hour(s))   Basic metabolic panel    Collection Time: 11/13/21  2:02 PM   Result Value Ref Range    Sodium 139 136 - 145 mmol/L    Potassium 3.6 3.5 - 5.0 mmol/L    Chloride 110 (H) 98 - 107 mmol/L    Carbon Dioxide (CO2) 22 22 - 31 mmol/L    Anion Gap 7 5 - 18 mmol/L    Urea Nitrogen 16 8 - 28 mg/dL    Creatinine 0.82 0.60 - 1.10 mg/dL    Calcium 9.5 8.5 - 10.5 mg/dL    Glucose 125 70 - 125 mg/dL    GFR Estimate 66 >60 mL/min/1.73m2   Hepatic function panel    Collection Time: 11/13/21  2:02 PM   Result Value Ref Range    Bilirubin Total 0.8 0.0 - 1.0 mg/dL    Bilirubin Direct 0.3 <=0.5 mg/dL    Protein Total 6.7 6.0 - 8.0 g/dL    Albumin 3.7 3.5 - 5.0 g/dL    Alkaline Phosphatase 76 45 - 120 U/L    AST 17 0 - 40 U/L    ALT 16 0 - 45 U/L   Troponin I (now)    Collection Time: 11/13/21  2:02 PM   Result Value Ref Range    Troponin I <0.01 0.00 - 0.29 ng/mL   Lipase    Collection Time: 11/13/21  2:02 PM   Result Value Ref Range    Lipase <9 0 - 52 U/L   CBC with platelets and differential    Collection Time: 11/13/21  2:23 PM   Result Value Ref Range    WBC Count 12.3 (H) 4.0 - 11.0 10e3/uL    RBC Count 4.62 3.80 - 5.20 10e6/uL    Hemoglobin 15.0 11.7 - 15.7 g/dL    Hematocrit 44.9 35.0 - 47.0 %    MCV 97 78 - 100 fL    MCH 32.5 26.5 - 33.0 pg    MCHC 33.4 31.5 - 36.5 g/dL    RDW 12.8 10.0 - 15.0 %    Platelet Count 191 150 - 450 10e3/uL    % Neutrophils 74 %    % Lymphocytes 18 %    % Monocytes 8 %    % Eosinophils 0 %    %  Basophils 0 %    % Immature Granulocytes 0 %    NRBCs per 100 WBC 0 <1 /100    Absolute Neutrophils 9.2 (H) 1.6 - 8.3 10e3/uL    Absolute Lymphocytes 2.2 0.8 - 5.3 10e3/uL    Absolute Monocytes 0.9 0.0 - 1.3 10e3/uL    Absolute Eosinophils 0.0 0.0 - 0.7 10e3/uL    Absolute Basophils 0.0 0.0 - 0.2 10e3/uL    Absolute Immature Granulocytes 0.0 <=0.0 10e3/uL    Absolute NRBCs 0.0 10e3/uL   INR    Collection Time: 11/13/21  2:23 PM   Result Value Ref Range    INR 1.35 (H) 0.85 - 1.15   ECG 12-LEAD WITH MUSE (LHE)    Collection Time: 11/13/21  2:26 PM   Result Value Ref Range    Systolic Blood Pressure  mmHg    Diastolic Blood Pressure  mmHg    Ventricular Rate 73 BPM    Atrial Rate 73 BPM    SC Interval 190 ms    QRS Duration 84 ms     ms    QTc 438 ms    P Axis 50 degrees    R AXIS -21 degrees    T Axis 26 degrees    Interpretation ECG       Sinus rhythm  Normal ECG  No previous ECGs available  Confirmed by SEE ED PROVIDER NOTE FOR, ECG INTERPRETATION (4000),  DESMOND TRAVIS ([id number]) on 11/13/2021 7:30:48 PM     UA with Microscopic reflex to Culture    Collection Time: 11/13/21  4:02 PM    Specimen: Urine, Midstream   Result Value Ref Range    Color Urine Light Yellow Colorless, Straw, Light Yellow, Yellow    Appearance Urine Clear Clear    Glucose Urine Negative Negative mg/dL    Bilirubin Urine Negative Negative    Ketones Urine 40  (A) Negative mg/dL    Specific Gravity Urine 1.020 1.001 - 1.030    Blood Urine 0.06 mg/dL (A) Negative    pH Urine 6.5 5.0 - 7.0    Protein Albumin Urine 10  (A) Negative mg/dL    Urobilinogen Urine <2.0 <2.0 mg/dL    Nitrite Urine Negative Negative    Leukocyte Esterase Urine 250 Malissa/uL (A) Negative    Bacteria Urine Few (A) None Seen /HPF    WBC Clumps Urine Present (A) None Seen /HPF    Mucus Urine Present (A) None Seen /LPF    RBC Urine 9 (H) <=2 /HPF    WBC Urine 46 (H) <=5 /HPF    Squamous Epithelials Urine <1 <=1 /HPF   Asymptomatic COVID-19 Virus (Coronavirus) by PCR  Nasopharyngeal    Collection Time: 11/13/21  4:02 PM    Specimen: Nasopharyngeal; Swab   Result Value Ref Range    SARS CoV2 PCR Negative Negative     ____________  Interval History   Data reviewed today: I reviewed all medications, new labs and imaging results over the last 24 hours. I personally reviewed no images or EKG's today.  Patient continues to have pain, got some pain meds and states now she is feeling more restful.  No new issues.      Surgery team recommending >48 hours off Xarelto before proceeding with surgery tomorrow.     Patient did ask that I call and update her daughter-in-law, Adrianne, today and I did.

## 2021-11-14 NOTE — PLAN OF CARE
PRIMARY DIAGNOSIS: BILIARY COLIC/UNCOMPLICATED EARLY ACUTE CHOLECYSTITIS  OUTPATIENT/OBSERVATION GOALS TO BE MET BEFORE DISCHARGE:    1. Pain status: Improved-controlled with oral pain medications. Call out to MD for additional pain medications. Heat pack applied.   2. Stable vital signs and labs (if performed) at disposition: Yes  3. Tolerating adequate PO diet: No pt NPO     4. Successful cholecystectomy or clear follow up plan with General Surgery team if immediate surgery not performed No surgery to see.   5. ADLs back to baseline?  No  6. Activity and level of assistance: Up with standby assistance.  7. Barriers to discharge noted Yes  Surgery consult      Discharge Planner Nurse   Safe discharge environment identified: No  Barriers to discharge: Yes       Entered by: Bessie Gomes 11/14/2021 1:59 AM     Please review provider order for any additional goals.   Nurse to notify provider when observation goals have been met and patient is ready for discharge.

## 2021-11-14 NOTE — PLAN OF CARE
Patient's left calf had some pain upon palpation. No redness, warmth noted, but patient has edema to bilateral LE +1. Attending updated, who instructed to monitor for worsening symptoms and to place SCDs. SCDs in room, pt is talking on phone at bedside and will place on after her call.

## 2021-11-14 NOTE — CONSULTS
General Surgery Consultation  Nelly Alaniz MRN# [id number]   Age/Sex: 81 year old female YOB: 1940     Reason for consult: 1. Acute cholecystitis    2. RUQ abdominal pain    3. Leukocytosis, unspecified type            Requesting physician: Dr Egan                   Assessment and Plan:   Assessment:  Acute cholecystitis  History of DVT/PE on Xarelto.  Last Xarelto Friday evening  Plan:  -For a full 48 hours off of Xarelto to proceed with laparoscopic cholecystectomy with Dr. Albright on Monday.  -Encourage pain control and ambulation as tolerated  -Clear liquid diet okay today.  N.p.o. after midnight  -Continue IV antibiotics  -Thank you for consulting us involving us in her care.    I have seen and examined the patient.  I have reviewed and agree with the note written by the NP/PA team member.   81-year-old female with several days of right upper quadrant pain.  Consistent with cholecystitis.  Has had similar pain in the past.  Last took Xarelto on Friday for history of DVT and PE.  Currently tolerating clear liquids but still nauseated.  Abdomen-soft, tender palpation right upper quadrant  Ultrasound images reviewed  Assessment/plan-acute cholecystitis  -Plan for laparoscopic cholecystectomy tomorrow to allow her to have 2 full days to allow the Xarelto have minimal effect  -N.p.o. after midnight    Hardik Albright D.O. [facility]  [phone]  [facility] Department of Surgery          Chief Complaint:     Chief Complaint   Patient presents with     Abdominal Pain        History is obtained from the patient as well as notes    HPI:   Nelly Alaniz is a 81 year old female with significant history of DVT/PE on Xarelto daily, sleep apnea who presents with acute abdominal pain.  Pain started 2 days ago that described as everywhere but mostly right upper quadrant that would radiate into her chest.  Pain would come and go.  Not necessarily associated with eating or drinking.  Pain increased and became nauseous and  felt constipated.  She had a small amount of vomiting.  She denies any change in her stools such as acholic stools.  She denies any further chest pain, shortness of breath, fever, joint pains.  She does have some swelling into her feet which is normal for her.  On evaluation in the emergency room she had a elevated white blood count of 12.3, normal LFTs and CMP INR of 1.35.  Ultrasound of abdomen shows stones and sludge with mild wall thickening and positive Barr sign.          Past Medical History:     Past Medical History:   Diagnosis Date     Depressive disorder      Obesity      Pulmonary emboli (H)               Past Surgical History:     Past Surgical History:   Procedure Laterality Date     HYSTERECTOMY               Social History:    reports that she has never smoked. She does not have any smokeless tobacco history on file. She reports previous alcohol use. She reports that she does not use drugs.           Family History:     Family History   Problem Relation Age of Onset     Cerebrovascular Disease Mother      Cerebrovascular Disease Maternal Grandmother      Cerebrovascular Disease Maternal Aunt               Allergies:   No Known Allergies           Medications:     Prior to Admission medications    Medication Sig Start Date End Date Taking? Authorizing Provider   acetaminophen (TYLENOL) 500 MG tablet Take 500-1,000 mg by mouth every 6 hours as needed for mild pain   Yes Unknown, Entered By History   buPROPion (WELLBUTRIN XL) 150 MG 24 hr tablet Take 300 mg by mouth every morning   Yes Unknown, Entered By History   calcium carbonate 600 mg-vitamin D 400 units (CALTRATE) 600-400 MG-UNIT per tablet Take 2 tablets by mouth daily   Yes Unknown, Entered By History   carboxymethylcellulose PF (REFRESH PLUS) 0.5 % ophthalmic solution Place 2 drops into both eyes 4 times daily as needed for dry eyes   Yes Unknown, Entered By History   cetirizine (ZYRTEC) 10 MG tablet Take 10 mg by mouth daily as needed for  allergies   Yes Unknown, Entered By History   Multiple Vitamins-Minerals (PRESERVISION AREDS) TABS Take 1 tablet by mouth 2 times daily   Yes Unknown, Entered By History   rivaroxaban ANTICOAGULANT (XARELTO) 20 MG TABS tablet Take 20 mg by mouth daily (with dinner)   Yes Unknown, Entered By History   topiramate (TOPAMAX) 50 MG tablet Take 50 mg by mouth 2 times daily   Yes Unknown, Entered By History              Review of Systems:   The Review of Systems is negative other than noted in the HPI            Physical Exam:     Patient Vitals for the past 24 hrs:   BP Temp Temp src Pulse Resp SpO2   11/14/21 0832 119/56 99.8  F (37.7  C) Oral 75 18 94 %   11/14/21 0404 131/66 98.7  F (37.1  C) Oral 74 18 96 %   11/14/21 0026 (!) 149/70 98.3  F (36.8  C) Oral 81 18 96 %   11/13/21 1900 (!) 187/81 -- Oral 80 20 97 %   11/13/21 1844 (!) 161/74 99.3  F (37.4  C) Oral 76 18 98 %   11/13/21 1730 (!) 164/77 -- -- 80 15 96 %   11/13/21 1700 (!) 170/71 -- -- 79 14 94 %   11/13/21 1447 -- -- -- -- -- 98 %   11/13/21 1445 (!) 173/81 -- -- 77 13 93 %   11/13/21 1322 (!) 194/90 98.6  F (37  C) Oral 75 18 98 %        No intake or output data in the 24 hours ending 11/14/21 0955   Constitutional:   awake, alert, cooperative, mild distress from pain s, and appears stated age       Eyes:   PERRL, conjunctiva/corneas clear, EOM's intact; no scleral edema or icterus noted        ENT:   Normocephalic, without obvious abnormality, atraumatic, Lips, mucosa, and tongue normal        Hematologic / Lymphatic:   No lymphadenopathy       Lungs:   Normal respiratory effort, no accessory muscle use       Cardiovascular:   Regular rate and rhythm       Abdomen:   Obese and soft with exquisite tenderness right upper quadrant       Musculoskeletal:   No obvious swelling, bruising or deformity       Skin:   Skin color and texture normal for patient, no rashes or lesions              Data:         All imaging studies reviewed by me.    Results for  orders placed or performed during the hospital encounter of 11/13/21 (from the past 24 hour(s))   Basic metabolic panel   Result Value Ref Range    Sodium 139 136 - 145 mmol/L    Potassium 3.6 3.5 - 5.0 mmol/L    Chloride 110 (H) 98 - 107 mmol/L    Carbon Dioxide (CO2) 22 22 - 31 mmol/L    Anion Gap 7 5 - 18 mmol/L    Urea Nitrogen 16 8 - 28 mg/dL    Creatinine 0.82 0.60 - 1.10 mg/dL    Calcium 9.5 8.5 - 10.5 mg/dL    Glucose 125 70 - 125 mg/dL    GFR Estimate 66 >60 mL/min/1.73m2   Hepatic function panel   Result Value Ref Range    Bilirubin Total 0.8 0.0 - 1.0 mg/dL    Bilirubin Direct 0.3 <=0.5 mg/dL    Protein Total 6.7 6.0 - 8.0 g/dL    Albumin 3.7 3.5 - 5.0 g/dL    Alkaline Phosphatase 76 45 - 120 U/L    AST 17 0 - 40 U/L    ALT 16 0 - 45 U/L   Troponin I (now)   Result Value Ref Range    Troponin I <0.01 0.00 - 0.29 ng/mL   Lipase   Result Value Ref Range    Lipase <9 0 - 52 U/L   CBC with platelets + differential    Narrative    The following orders were created for panel order CBC with platelets + differential.  Procedure                               Abnormality         Status                     ---------                               -----------         ------                     CBC with platelets and d...[[id number]]  Abnormal            Final result                 Please view results for these tests on the individual orders.   CBC with platelets and differential   Result Value Ref Range    WBC Count 12.3 (H) 4.0 - 11.0 10e3/uL    RBC Count 4.62 3.80 - 5.20 10e6/uL    Hemoglobin 15.0 11.7 - 15.7 g/dL    Hematocrit 44.9 35.0 - 47.0 %    MCV 97 78 - 100 fL    MCH 32.5 26.5 - 33.0 pg    MCHC 33.4 31.5 - 36.5 g/dL    RDW 12.8 10.0 - 15.0 %    Platelet Count 191 150 - 450 10e3/uL    % Neutrophils 74 %    % Lymphocytes 18 %    % Monocytes 8 %    % Eosinophils 0 %    % Basophils 0 %    % Immature Granulocytes 0 %    NRBCs per 100 WBC 0 <1 /100    Absolute Neutrophils 9.2 (H) 1.6 - 8.3 10e3/uL    Absolute  Lymphocytes 2.2 0.8 - 5.3 10e3/uL    Absolute Monocytes 0.9 0.0 - 1.3 10e3/uL    Absolute Eosinophils 0.0 0.0 - 0.7 10e3/uL    Absolute Basophils 0.0 0.0 - 0.2 10e3/uL    Absolute Immature Granulocytes 0.0 <=0.0 10e3/uL    Absolute NRBCs 0.0 10e3/uL   INR   Result Value Ref Range    INR 1.35 (H) 0.85 - 1.15   ECG 12-LEAD WITH MUSE (LHE)   Result Value Ref Range    Systolic Blood Pressure  mmHg    Diastolic Blood Pressure  mmHg    Ventricular Rate 73 BPM    Atrial Rate 73 BPM    HI Interval 190 ms    QRS Duration 84 ms     ms    QTc 438 ms    P Axis 50 degrees    R AXIS -21 degrees    T Axis 26 degrees    Interpretation ECG       Sinus rhythm  Normal ECG  No previous ECGs available  Confirmed by SEE ED PROVIDER NOTE FOR, ECG INTERPRETATION (4000),  DESMOND TRAVIS ([id number]) on 11/13/2021 7:30:48 PM     Abdomen US, limited (RUQ only)    Narrative    EXAM: US ABDOMEN LIMITED  LOCATION: [facility]  DATE/TIME: 11/13/2021 2:45 PM    INDICATION: ruq abd pain +sludge on bedside us  COMPARISON: None.  TECHNIQUE: Limited abdominal ultrasound.    FINDINGS:    GALLBLADDER: Sludge and stones in the gallbladder. Mild wall thickening measuring 5 mm. No pericholecystic fluid. Positive sonographic Barr sign. Overall findings are consistent with acute cholecystitis.    BILE DUCTS: No biliary dilatation. The common duct measures 5 mm.    LIVER: Normal parenchyma with smooth contour. No focal mass.    RIGHT KIDNEY: No hydronephrosis.    PANCREAS: The visualized portions are normal.    No ascites.      Impression    IMPRESSION:  1.  Acute cholecystitis.       UA with Microscopic reflex to Culture    Specimen: Urine, Midstream   Result Value Ref Range    Color Urine Light Yellow Colorless, Straw, Light Yellow, Yellow    Appearance Urine Clear Clear    Glucose Urine Negative Negative mg/dL    Bilirubin Urine Negative Negative    Ketones Urine 40  (A) Negative mg/dL    Specific Gravity Urine 1.020 1.001  - 1.030    Blood Urine 0.06 mg/dL (A) Negative    pH Urine 6.5 5.0 - 7.0    Protein Albumin Urine 10  (A) Negative mg/dL    Urobilinogen Urine <2.0 <2.0 mg/dL    Nitrite Urine Negative Negative    Leukocyte Esterase Urine 250 Malissa/uL (A) Negative    Bacteria Urine Few (A) None Seen /HPF    WBC Clumps Urine Present (A) None Seen /HPF    Mucus Urine Present (A) None Seen /LPF    RBC Urine 9 (H) <=2 /HPF    WBC Urine 46 (H) <=5 /HPF    Squamous Epithelials Urine <1 <=1 /HPF    Narrative    Urine Culture ordered based on laboratory criteria   Asymptomatic COVID-19 Virus (Coronavirus) by PCR Nasopharyngeal    Specimen: Nasopharyngeal; Swab   Result Value Ref Range    SARS CoV2 PCR Negative Negative    Narrative    Testing was performed using the kristie  SARS-CoV-2 & Influenza A/B Assay on the kristie  [employer]  System.  This test should be ordered for the detection of SARS-COV-2 in individuals who meet SARS-CoV-2 clinical and/or epidemiological criteria. Test performance is unknown in asymptomatic patients.  This test is for in vitro diagnostic use under the FDA EUA for laboratories certified under CLIA to perform moderate and/or high complexity testing. This test has not been FDA cleared or approved.  A negative test does not rule out the presence of PCR inhibitors in the specimen or target RNA in concentration below the limit of detection for the assay. The possibility of a false negative should be considered if the patient's recent exposure or clinical presentation suggests COVID-19.  [facility] are certified under the Clinical Laboratory Improvement Amendments of 1988 (CLIA-88) as qualified to perform moderate and/or high complexity laboratory testing.   Social Work/ Care Management IP Consult    Narrative    Devika Hughes RN     11/13/2021  6:58 PM  Care Management Initial Consult    General Information  Assessment completed with: Patient, pt  Type of CM/SW Visit: Initial Assessment    Primary Care  Provider verified and updated as needed: Yes   Readmission within the last 30 days: no previous admission in   last 30 days   Return Category: Progression of disease  Reason for Consult:   discharge planning  Advance Care Planning: Advance Care Planning Reviewed: no   concerns identified  requested copy  General Information Comments: lives alone    Communication Assessment  Patient's communication style: spoken language (English or   Bilingual)    Hearing Difficulty or Deaf: no   Wear Glasses or Blind: yes    Cognitive  Cognitive/Neuro/Behavioral: WDL                      Living Environment:   People in home: alone     Current living Arrangements: house      Able to return to prior arrangements: yes       Family/Social Support:  Care provided by: self  Provides care for: no one  Marital Status:   Children          Description of Support System: Supportive    Support Assessment: Adequate family and caregiver support    Current Resources:   Patient receiving home care services: No     Community Resources: DME  Equipment currently used at home: cane, straight  Supplies currently used at home: None    Employment/Financial:  Employment Status:          Financial Concerns: No concerns identified   Referral to Financial Counselor: No       Lifestyle & Psychosocial Needs:  Social Determinants of Health     Tobacco Use: Unknown     Smoking Tobacco Use: Never Smoker     Smokeless Tobacco Use: Unknown   Alcohol Use: Not on file   Financial Resource Strain: Not on file   Food Insecurity: Not on file   Transportation Needs: Not on file   Physical Activity: Not on file   Stress: Not on file   Social Connections: Not on file   Intimate Partner Violence: Not on file   Depression: Not on file   Housing Stability: Not on file       Functional Status:  Prior to admission patient needed assistance:   Dependent ADLs:: Ambulation-cane,Ambulation-walker  Dependent IADLs:: Transportation  Assesssment of Functional Status: Not at  baseline with ADL   Functioning    Mental Health Status:  Mental Health Status: No Current Concerns       Chemical Dependency Status:                Values/Beliefs:  Spiritual, Cultural Beliefs, Mandaeism Practices, Values that   affect care:                 Additional Information:  GRETCHEN assessed, lives alone and daughter in laws are helpful and   will transport at discharge. No other svcs and discussed COLLINS.      Devika Hughes, RN              Todd Perkins PAYoungC

## 2021-11-14 NOTE — UTILIZATION REVIEW
Admission Status; Secondary Review Determination       Under the authority of the Utilization Management Committee, the utilization review process indicated a secondary review on the above patient. The review outcome is based on review of the medical records, discussions with staff, and applying clinical experience noted on the date of the review.     (x) Inpatient Status Appropriate - This patient's medical care is consistent with medical management for inpatient care and reasonable inpatient medical practice.     RATIONALE FOR DETERMINATION   More than 2 nights Hospital complex care is anticipated, based on patient risk of adverse outcome if treated as outpatient and complex care required. Inpatient admission is appropriate based on the Medicare guidelines.     SUMMARY:  80 y/o female presented with abdominal pain.  She was found to have acute cholecystitis.  Her care is complicated by a history of DVT/PE with chronic xarelto usage.  She also has ongoing pain and is felt to need IV antibiotics.  Surgery has seen the patient and does not feel it is safe to proceed with surgery before Monday due to the anticoagulation use and potential for bleeding.      The information on this document is developed by the utilization review team in order for the business office to ensure compliance. This only denotes the appropriateness of proper admission status and does not reflect the quality of care rendered.   The definitions of Inpatient Status and Observation Status used in making the determination above are those provided in the CMS Coverage Manual, Chapter 1 and Chapter 6, section 70.4.     Sincerely,     Wilbur Dumont DO, [facility], [facility]  Utilization Review  Physician Advisor

## 2021-11-15 ENCOUNTER — ANESTHESIA (OUTPATIENT)
Dept: SURGERY | Facility: HOSPITAL | Age: 81
DRG: 419 | End: 2021-11-15
Payer: MEDICARE

## 2021-11-15 ENCOUNTER — ANESTHESIA EVENT (OUTPATIENT)
Dept: SURGERY | Facility: HOSPITAL | Age: 81
DRG: 419 | End: 2021-11-15
Payer: MEDICARE

## 2021-11-15 LAB — BACTERIA UR CULT: NO GROWTH

## 2021-11-15 PROCEDURE — 250N000011 HC RX IP 250 OP 636: Performed by: NURSE ANESTHETIST, CERTIFIED REGISTERED

## 2021-11-15 PROCEDURE — 250N000011 HC RX IP 250 OP 636: Performed by: HOSPITALIST

## 2021-11-15 PROCEDURE — 250N000013 HC RX MED GY IP 250 OP 250 PS 637: Performed by: HOSPITALIST

## 2021-11-15 PROCEDURE — 250N000011 HC RX IP 250 OP 636: Performed by: SURGERY

## 2021-11-15 PROCEDURE — 999N000141 HC STATISTIC PRE-PROCEDURE NURSING ASSESSMENT: Performed by: SURGERY

## 2021-11-15 PROCEDURE — 250N000011 HC RX IP 250 OP 636: Performed by: ANESTHESIOLOGY

## 2021-11-15 PROCEDURE — 250N000013 HC RX MED GY IP 250 OP 250 PS 637: Performed by: SURGERY

## 2021-11-15 PROCEDURE — 250N000013 HC RX MED GY IP 250 OP 250 PS 637: Performed by: ANESTHESIOLOGY

## 2021-11-15 PROCEDURE — 258N000003 HC RX IP 258 OP 636: Performed by: NURSE ANESTHETIST, CERTIFIED REGISTERED

## 2021-11-15 PROCEDURE — 370N000017 HC ANESTHESIA TECHNICAL FEE, PER MIN: Performed by: SURGERY

## 2021-11-15 PROCEDURE — 258N000003 HC RX IP 258 OP 636: Performed by: ANESTHESIOLOGY

## 2021-11-15 PROCEDURE — 272N000001 HC OR GENERAL SUPPLY STERILE: Performed by: SURGERY

## 2021-11-15 PROCEDURE — G0008 ADMIN INFLUENZA VIRUS VAC: HCPCS | Performed by: HOSPITALIST

## 2021-11-15 PROCEDURE — 250N000009 HC RX 250: Performed by: NURSE ANESTHETIST, CERTIFIED REGISTERED

## 2021-11-15 PROCEDURE — 272N000004 HC RX 272: Performed by: SURGERY

## 2021-11-15 PROCEDURE — 0FT44ZZ RESECTION OF GALLBLADDER, PERCUTANEOUS ENDOSCOPIC APPROACH: ICD-10-PCS | Performed by: SURGERY

## 2021-11-15 PROCEDURE — 250N000011 HC RX IP 250 OP 636: Performed by: STUDENT IN AN ORGANIZED HEALTH CARE EDUCATION/TRAINING PROGRAM

## 2021-11-15 PROCEDURE — 88304 TISSUE EXAM BY PATHOLOGIST: CPT | Mod: TC | Performed by: SURGERY

## 2021-11-15 PROCEDURE — 99231 SBSQ HOSP IP/OBS SF/LOW 25: CPT | Performed by: HOSPITALIST

## 2021-11-15 PROCEDURE — 710N000009 HC RECOVERY PHASE 1, LEVEL 1, PER MIN: Performed by: SURGERY

## 2021-11-15 PROCEDURE — 90662 IIV NO PRSV INCREASED AG IM: CPT | Performed by: HOSPITALIST

## 2021-11-15 PROCEDURE — 360N000076 HC SURGERY LEVEL 3, PER MIN: Performed by: SURGERY

## 2021-11-15 PROCEDURE — 120N000001 HC R&B MED SURG/OB

## 2021-11-15 PROCEDURE — 47562 LAPAROSCOPIC CHOLECYSTECTOMY: CPT | Performed by: SURGERY

## 2021-11-15 RX ORDER — LIDOCAINE 40 MG/G
CREAM TOPICAL
Status: DISCONTINUED | OUTPATIENT
Start: 2021-11-15 | End: 2021-11-18 | Stop reason: HOSPADM

## 2021-11-15 RX ORDER — CEFAZOLIN SODIUM 2 G/100ML
2 INJECTION, SOLUTION INTRAVENOUS SEE ADMIN INSTRUCTIONS
Status: DISCONTINUED | OUTPATIENT
Start: 2021-11-15 | End: 2021-11-15 | Stop reason: HOSPADM

## 2021-11-15 RX ORDER — ONDANSETRON 2 MG/ML
4 INJECTION INTRAMUSCULAR; INTRAVENOUS EVERY 30 MIN PRN
Status: DISCONTINUED | OUTPATIENT
Start: 2021-11-15 | End: 2021-11-15 | Stop reason: HOSPADM

## 2021-11-15 RX ORDER — FENTANYL CITRATE 50 UG/ML
INJECTION, SOLUTION INTRAMUSCULAR; INTRAVENOUS PRN
Status: DISCONTINUED | OUTPATIENT
Start: 2021-11-15 | End: 2021-11-15

## 2021-11-15 RX ORDER — KETOROLAC TROMETHAMINE 30 MG/ML
INJECTION, SOLUTION INTRAMUSCULAR; INTRAVENOUS PRN
Status: DISCONTINUED | OUTPATIENT
Start: 2021-11-15 | End: 2021-11-15

## 2021-11-15 RX ORDER — FENTANYL CITRATE 50 UG/ML
25-50 INJECTION, SOLUTION INTRAMUSCULAR; INTRAVENOUS EVERY 5 MIN PRN
Status: DISCONTINUED | OUTPATIENT
Start: 2021-11-15 | End: 2021-11-15 | Stop reason: HOSPADM

## 2021-11-15 RX ORDER — LIDOCAINE HYDROCHLORIDE 20 MG/ML
INJECTION, SOLUTION INFILTRATION; PERINEURAL PRN
Status: DISCONTINUED | OUTPATIENT
Start: 2021-11-15 | End: 2021-11-15

## 2021-11-15 RX ORDER — PROCHLORPERAZINE MALEATE 5 MG
5 TABLET ORAL EVERY 6 HOURS PRN
Status: DISCONTINUED | OUTPATIENT
Start: 2021-11-15 | End: 2021-11-18 | Stop reason: HOSPADM

## 2021-11-15 RX ORDER — HALOPERIDOL 5 MG/ML
1 INJECTION INTRAMUSCULAR
Status: COMPLETED | OUTPATIENT
Start: 2021-11-15 | End: 2021-11-15

## 2021-11-15 RX ORDER — OXYCODONE HYDROCHLORIDE 5 MG/1
5 TABLET ORAL EVERY 4 HOURS PRN
Status: DISCONTINUED | OUTPATIENT
Start: 2021-11-15 | End: 2021-11-15 | Stop reason: HOSPADM

## 2021-11-15 RX ORDER — HYDROMORPHONE HCL IN WATER/PF 6 MG/30 ML
0.4 PATIENT CONTROLLED ANALGESIA SYRINGE INTRAVENOUS
Status: DISCONTINUED | OUTPATIENT
Start: 2021-11-15 | End: 2021-11-18 | Stop reason: HOSPADM

## 2021-11-15 RX ORDER — HYDROMORPHONE HCL IN WATER/PF 6 MG/30 ML
0.2 PATIENT CONTROLLED ANALGESIA SYRINGE INTRAVENOUS EVERY 5 MIN PRN
Status: DISCONTINUED | OUTPATIENT
Start: 2021-11-15 | End: 2021-11-15 | Stop reason: HOSPADM

## 2021-11-15 RX ORDER — BISACODYL 10 MG
10 SUPPOSITORY, RECTAL RECTAL DAILY PRN
Status: DISCONTINUED | OUTPATIENT
Start: 2021-11-15 | End: 2021-11-18 | Stop reason: HOSPADM

## 2021-11-15 RX ORDER — AMOXICILLIN 250 MG
1 CAPSULE ORAL 2 TIMES DAILY
Status: DISCONTINUED | OUTPATIENT
Start: 2021-11-15 | End: 2021-11-18 | Stop reason: HOSPADM

## 2021-11-15 RX ORDER — SODIUM CHLORIDE, SODIUM LACTATE, POTASSIUM CHLORIDE, CALCIUM CHLORIDE 600; 310; 30; 20 MG/100ML; MG/100ML; MG/100ML; MG/100ML
INJECTION, SOLUTION INTRAVENOUS CONTINUOUS
Status: CANCELLED | OUTPATIENT
Start: 2021-11-15

## 2021-11-15 RX ORDER — ACETAMINOPHEN 325 MG/1
650 TABLET ORAL EVERY 4 HOURS PRN
Status: DISCONTINUED | OUTPATIENT
Start: 2021-11-18 | End: 2021-11-18 | Stop reason: HOSPADM

## 2021-11-15 RX ORDER — VITS A,C,E/LUTEIN/MINERALS 300MCG-200
1 TABLET ORAL 2 TIMES DAILY
Status: DISCONTINUED | OUTPATIENT
Start: 2021-11-15 | End: 2021-11-18 | Stop reason: HOSPADM

## 2021-11-15 RX ORDER — GLYCOPYRROLATE 0.2 MG/ML
INJECTION, SOLUTION INTRAMUSCULAR; INTRAVENOUS PRN
Status: DISCONTINUED | OUTPATIENT
Start: 2021-11-15 | End: 2021-11-15

## 2021-11-15 RX ORDER — ONDANSETRON 4 MG/1
4 TABLET, ORALLY DISINTEGRATING ORAL EVERY 6 HOURS PRN
Status: DISCONTINUED | OUTPATIENT
Start: 2021-11-15 | End: 2021-11-18 | Stop reason: HOSPADM

## 2021-11-15 RX ORDER — ACETAMINOPHEN 325 MG/1
975 TABLET ORAL
Status: COMPLETED | OUTPATIENT
Start: 2021-11-15 | End: 2021-11-15

## 2021-11-15 RX ORDER — ONDANSETRON 2 MG/ML
INJECTION INTRAMUSCULAR; INTRAVENOUS PRN
Status: DISCONTINUED | OUTPATIENT
Start: 2021-11-15 | End: 2021-11-15

## 2021-11-15 RX ORDER — PROPOFOL 10 MG/ML
INJECTION, EMULSION INTRAVENOUS PRN
Status: DISCONTINUED | OUTPATIENT
Start: 2021-11-15 | End: 2021-11-15

## 2021-11-15 RX ORDER — CEFAZOLIN SODIUM 2 G/100ML
2 INJECTION, SOLUTION INTRAVENOUS
Status: COMPLETED | OUTPATIENT
Start: 2021-11-15 | End: 2021-11-15

## 2021-11-15 RX ORDER — BUPIVACAINE HYDROCHLORIDE 2.5 MG/ML
INJECTION, SOLUTION INFILTRATION; PERINEURAL PRN
Status: DISCONTINUED | OUTPATIENT
Start: 2021-11-15 | End: 2021-11-15 | Stop reason: HOSPADM

## 2021-11-15 RX ORDER — POLYETHYLENE GLYCOL 3350 17 G/17G
17 POWDER, FOR SOLUTION ORAL DAILY
Status: DISCONTINUED | OUTPATIENT
Start: 2021-11-16 | End: 2021-11-18 | Stop reason: HOSPADM

## 2021-11-15 RX ORDER — PROPOFOL 10 MG/ML
INJECTION, EMULSION INTRAVENOUS CONTINUOUS PRN
Status: DISCONTINUED | OUTPATIENT
Start: 2021-11-15 | End: 2021-11-15

## 2021-11-15 RX ORDER — ONDANSETRON 4 MG/1
4 TABLET, ORALLY DISINTEGRATING ORAL EVERY 30 MIN PRN
Status: DISCONTINUED | OUTPATIENT
Start: 2021-11-15 | End: 2021-11-15 | Stop reason: HOSPADM

## 2021-11-15 RX ORDER — SODIUM CHLORIDE, SODIUM LACTATE, POTASSIUM CHLORIDE, CALCIUM CHLORIDE 600; 310; 30; 20 MG/100ML; MG/100ML; MG/100ML; MG/100ML
INJECTION, SOLUTION INTRAVENOUS CONTINUOUS
Status: DISCONTINUED | OUTPATIENT
Start: 2021-11-15 | End: 2021-11-15 | Stop reason: HOSPADM

## 2021-11-15 RX ORDER — LIDOCAINE 40 MG/G
CREAM TOPICAL
Status: CANCELLED | OUTPATIENT
Start: 2021-11-15

## 2021-11-15 RX ORDER — MAGNESIUM SULFATE 4 G/50ML
4 INJECTION INTRAVENOUS ONCE
Status: CANCELLED | OUTPATIENT
Start: 2021-11-15 | End: 2021-11-15

## 2021-11-15 RX ORDER — LABETALOL HYDROCHLORIDE 5 MG/ML
5 INJECTION, SOLUTION INTRAVENOUS EVERY 5 MIN PRN
Status: DISCONTINUED | OUTPATIENT
Start: 2021-11-15 | End: 2021-11-15 | Stop reason: HOSPADM

## 2021-11-15 RX ORDER — ONDANSETRON 2 MG/ML
4 INJECTION INTRAMUSCULAR; INTRAVENOUS EVERY 6 HOURS PRN
Status: DISCONTINUED | OUTPATIENT
Start: 2021-11-15 | End: 2021-11-18 | Stop reason: HOSPADM

## 2021-11-15 RX ORDER — BUPROPION HYDROCHLORIDE 300 MG/1
300 TABLET ORAL EVERY MORNING
Status: DISCONTINUED | OUTPATIENT
Start: 2021-11-16 | End: 2021-11-18 | Stop reason: HOSPADM

## 2021-11-15 RX ORDER — ACETAMINOPHEN 325 MG/1
975 TABLET ORAL EVERY 8 HOURS
Status: COMPLETED | OUTPATIENT
Start: 2021-11-15 | End: 2021-11-18

## 2021-11-15 RX ORDER — FENTANYL CITRATE 50 UG/ML
25 INJECTION, SOLUTION INTRAMUSCULAR; INTRAVENOUS
Status: CANCELLED | OUTPATIENT
Start: 2021-11-15

## 2021-11-15 RX ORDER — DEXAMETHASONE SODIUM PHOSPHATE 10 MG/ML
INJECTION, SOLUTION INTRAMUSCULAR; INTRAVENOUS PRN
Status: DISCONTINUED | OUTPATIENT
Start: 2021-11-15 | End: 2021-11-15

## 2021-11-15 RX ORDER — ESMOLOL HYDROCHLORIDE 10 MG/ML
INJECTION INTRAVENOUS PRN
Status: DISCONTINUED | OUTPATIENT
Start: 2021-11-15 | End: 2021-11-15

## 2021-11-15 RX ORDER — OXYCODONE HYDROCHLORIDE 5 MG/1
5 TABLET ORAL EVERY 4 HOURS PRN
Status: DISCONTINUED | OUTPATIENT
Start: 2021-11-15 | End: 2021-11-18 | Stop reason: HOSPADM

## 2021-11-15 RX ORDER — OXYCODONE HYDROCHLORIDE 5 MG/1
10 TABLET ORAL EVERY 4 HOURS PRN
Status: DISCONTINUED | OUTPATIENT
Start: 2021-11-15 | End: 2021-11-18 | Stop reason: HOSPADM

## 2021-11-15 RX ORDER — HYDROMORPHONE HCL IN WATER/PF 6 MG/30 ML
0.2 PATIENT CONTROLLED ANALGESIA SYRINGE INTRAVENOUS
Status: DISCONTINUED | OUTPATIENT
Start: 2021-11-15 | End: 2021-11-18 | Stop reason: HOSPADM

## 2021-11-15 RX ORDER — TOPIRAMATE 25 MG/1
50 TABLET, FILM COATED ORAL 2 TIMES DAILY
Status: DISCONTINUED | OUTPATIENT
Start: 2021-11-15 | End: 2021-11-18 | Stop reason: HOSPADM

## 2021-11-15 RX ADMIN — ROCURONIUM BROMIDE 30 MG: 50 INJECTION, SOLUTION INTRAVENOUS at 11:54

## 2021-11-15 RX ADMIN — INFLUENZA A VIRUS A/VICTORIA/2570/2019 IVR-215 (H1N1) ANTIGEN (FORMALDEHYDE INACTIVATED), INFLUENZA A VIRUS A/TASMANIA/503/2020 IVR-221 (H3N2) ANTIGEN (FORMALDEHYDE INACTIVATED), INFLUENZA B VIRUS B/PHUKET/3073/2013 ANTIGEN (FORMALDEHYDE INACTIVATED), AND INFLUENZA B VIRUS B/WASHINGTON/02/2019 ANTIGEN (FORMALDEHYDE INACTIVATED) 0.7 ML: 60; 60; 60; 60 INJECTION, SUSPENSION INTRAMUSCULAR at 10:58

## 2021-11-15 RX ADMIN — DEXAMETHASONE SODIUM PHOSPHATE 10 MG: 10 INJECTION, SOLUTION INTRAMUSCULAR; INTRAVENOUS at 11:54

## 2021-11-15 RX ADMIN — GLYCOPYRROLATE 0.2 MG: 0.2 INJECTION, SOLUTION INTRAMUSCULAR; INTRAVENOUS at 11:54

## 2021-11-15 RX ADMIN — TOPIRAMATE 50 MG: 25 TABLET ORAL at 22:04

## 2021-11-15 RX ADMIN — PHENYLEPHRINE HYDROCHLORIDE 100 MCG: 10 INJECTION INTRAVENOUS at 12:04

## 2021-11-15 RX ADMIN — ACETAMINOPHEN 975 MG: 325 TABLET ORAL at 11:41

## 2021-11-15 RX ADMIN — SENNOSIDES, DOCUSATE SODIUM 1 TABLET: 8.6; 5 TABLET ORAL at 22:04

## 2021-11-15 RX ADMIN — CEFAZOLIN SODIUM 2 G: 2 INJECTION, SOLUTION INTRAVENOUS at 12:02

## 2021-11-15 RX ADMIN — ONDANSETRON 4 MG: 2 INJECTION INTRAMUSCULAR; INTRAVENOUS at 11:54

## 2021-11-15 RX ADMIN — LIDOCAINE HYDROCHLORIDE 60 MG: 20 INJECTION, SOLUTION INFILTRATION; PERINEURAL at 11:54

## 2021-11-15 RX ADMIN — ESMOLOL HYDROCHLORIDE 50 MG: 100 INJECTION, SOLUTION INTRAVENOUS at 11:54

## 2021-11-15 RX ADMIN — PROPOFOL 100 MG: 10 INJECTION, EMULSION INTRAVENOUS at 11:54

## 2021-11-15 RX ADMIN — FENTANYL CITRATE 50 MCG: 50 INJECTION, SOLUTION INTRAMUSCULAR; INTRAVENOUS at 12:22

## 2021-11-15 RX ADMIN — PIPERACILLIN SODIUM AND TAZOBACTAM SODIUM 3.38 G: 3; .375 INJECTION, POWDER, LYOPHILIZED, FOR SOLUTION INTRAVENOUS at 22:13

## 2021-11-15 RX ADMIN — SUGAMMADEX 200 MG: 100 INJECTION, SOLUTION INTRAVENOUS at 13:19

## 2021-11-15 RX ADMIN — HYDROMORPHONE HYDROCHLORIDE 0.4 MG: 0.2 INJECTION, SOLUTION INTRAMUSCULAR; INTRAVENOUS; SUBCUTANEOUS at 22:19

## 2021-11-15 RX ADMIN — KETOROLAC TROMETHAMINE 15 MG: 30 INJECTION, SOLUTION INTRAMUSCULAR; INTRAVENOUS at 13:13

## 2021-11-15 RX ADMIN — SODIUM CHLORIDE, POTASSIUM CHLORIDE, SODIUM LACTATE AND CALCIUM CHLORIDE: 600; 310; 30; 20 INJECTION, SOLUTION INTRAVENOUS at 13:26

## 2021-11-15 RX ADMIN — PROPOFOL 200 MCG/KG/MIN: 10 INJECTION, EMULSION INTRAVENOUS at 11:54

## 2021-11-15 RX ADMIN — PIPERACILLIN SODIUM AND TAZOBACTAM SODIUM 3.38 G: 3; .375 INJECTION, POWDER, LYOPHILIZED, FOR SOLUTION INTRAVENOUS at 05:13

## 2021-11-15 RX ADMIN — ACETAMINOPHEN 975 MG: 325 TABLET ORAL at 16:20

## 2021-11-15 RX ADMIN — PHENYLEPHRINE HYDROCHLORIDE 100 MCG: 10 INJECTION INTRAVENOUS at 12:42

## 2021-11-15 RX ADMIN — PHENYLEPHRINE HYDROCHLORIDE 100 MCG: 10 INJECTION INTRAVENOUS at 12:10

## 2021-11-15 RX ADMIN — ACETAMINOPHEN 975 MG: 325 TABLET ORAL at 22:08

## 2021-11-15 RX ADMIN — ONDANSETRON 4 MG: 2 INJECTION INTRAMUSCULAR; INTRAVENOUS at 04:34

## 2021-11-15 RX ADMIN — HYDROMORPHONE HYDROCHLORIDE 0.2 MG: 0.2 INJECTION, SOLUTION INTRAMUSCULAR; INTRAVENOUS; SUBCUTANEOUS at 06:42

## 2021-11-15 RX ADMIN — HALOPERIDOL LACTATE 1 MG: 5 INJECTION, SOLUTION INTRAMUSCULAR at 13:53

## 2021-11-15 RX ADMIN — PIPERACILLIN SODIUM AND TAZOBACTAM SODIUM 3.38 G: 3; .375 INJECTION, POWDER, LYOPHILIZED, FOR SOLUTION INTRAVENOUS at 16:21

## 2021-11-15 RX ADMIN — HYDROMORPHONE HYDROCHLORIDE 0.2 MG: 0.2 INJECTION, SOLUTION INTRAMUSCULAR; INTRAVENOUS; SUBCUTANEOUS at 04:35

## 2021-11-15 RX ADMIN — Medication 1 TABLET: at 22:04

## 2021-11-15 RX ADMIN — ROCURONIUM BROMIDE 10 MG: 50 INJECTION, SOLUTION INTRAVENOUS at 12:10

## 2021-11-15 RX ADMIN — SODIUM CHLORIDE, POTASSIUM CHLORIDE, SODIUM LACTATE AND CALCIUM CHLORIDE: 600; 310; 30; 20 INJECTION, SOLUTION INTRAVENOUS at 11:41

## 2021-11-15 RX ADMIN — FENTANYL CITRATE 25 MCG: 50 INJECTION, SOLUTION INTRAMUSCULAR; INTRAVENOUS at 12:27

## 2021-11-15 RX ADMIN — FENTANYL CITRATE 25 MCG: 50 INJECTION, SOLUTION INTRAMUSCULAR; INTRAVENOUS at 11:54

## 2021-11-15 RX ADMIN — HYDROMORPHONE HYDROCHLORIDE 0.4 MG: 0.2 INJECTION, SOLUTION INTRAMUSCULAR; INTRAVENOUS; SUBCUTANEOUS at 16:39

## 2021-11-15 RX ADMIN — HYDROMORPHONE HYDROCHLORIDE 0.4 MG: 0.2 INJECTION, SOLUTION INTRAMUSCULAR; INTRAVENOUS; SUBCUTANEOUS at 14:46

## 2021-11-15 ASSESSMENT — ACTIVITIES OF DAILY LIVING (ADL)
ADLS_ACUITY_SCORE: 11
ADLS_ACUITY_SCORE: 9
ADLS_ACUITY_SCORE: 11
ADLS_ACUITY_SCORE: 9

## 2021-11-15 ASSESSMENT — MIFFLIN-ST. JEOR: SCORE: 1229.03

## 2021-11-15 NOTE — PLAN OF CARE
Pt was scheduled to have lap choley today @ 12:30pm. Pt was picked up by [facility] @ 11am. CHG bath done before leaving the floor. Vital signs were stable.   Pt returned to the unit from PACU @ 1440. Pt was settled into the bed, was having moderate abdominal pain after moving into the bed. Pt rated pain at 7/10 and requested for IV dilaudid. IV dilaudid 0.4mg given, pt was sleeping upon reassessment. Pt has lap sites x3, and drain site (RODOLFO drain). RODOLFO drain had 10cc out. Abdominal sounds are active.   Pt has been lethargic since returned from PACU but arouses with voice. Pt reported having minimal nausea, requested ice chips which was given. Continues to be on RA, continuous pulse oximetry is on and sats have been 93%.     Problem: Adult Inpatient Plan of Care  Goal: Absence of Hospital-Acquired Illness or Injury  Outcome: Improving  Intervention: Identify and Manage Fall Risk  Recent Flowsheet Documentation  Taken 11/15/2021 1450 by Javed mOalley RN  Safety Promotion/Fall Prevention:   activity supervised   assistive device/personal items within reach   bed alarm on   clutter free environment maintained   fall prevention program maintained   nonskid shoes/slippers when out of bed   safety round/check completed  Taken 11/15/2021 1000 by Javed Omalley RN  Safety Promotion/Fall Prevention:   activity supervised   assistive device/personal items within reach   bed alarm on   clutter free environment maintained   fall prevention program maintained   nonskid shoes/slippers when out of bed   safety round/check completed  Intervention: Prevent and Manage VTE (Venous Thromboembolism) Risk  Recent Flowsheet Documentation  Taken 11/15/2021 1450 by Javed Omalley, RN  VTE Prevention/Management: pneumatic compression device  Taken 11/15/2021 0845 by Javed Omalley, MARIYA  VTE Prevention/Management: ambulation promoted     Problem: Pain Acute  Goal: Acceptable Pain Control and Functional Ability  Outcome:  Improving  Intervention: Develop Pain Management Plan  Recent Flowsheet Documentation  Taken 11/15/2021 1446 by Jaevd Omalley, RN  Pain Management Interventions:   medication (see MAR)   cold applied  Taken 11/15/2021 0900 by Javed Omalley, RN  Pain Management Interventions: declines

## 2021-11-15 NOTE — PROGRESS NOTES
Spiritual Health Note    Spiritual Assessment:     visited patient due to consult. Patient shared about medical condition and history, particularly related to her upcoming surgery to remove her gall bladder.     Patient shared about family/life history. She shared about her late , who  over 20 years ago. Two of her three children are also . She shared about the grief/loss associated with their deaths and the loneliness that she has experienced.     Patient comes from [employer] mar[employer] background and derives meaning, purpose, and comfort from maritza. Her [employer] closed down prior to covid pandemic, so she is also without a [employer] community. She has been recently attending a friend's [employer], which has been helpful.    Patient welcomed prayer and expressed appreciation for the visit.      Care Provided:    Introduced self and role of Spiritual Care     Empathic listening and presence     Helped patient in processing of emotions     Guided patient in exploration of beliefs    Facilitated storytelling and life review     Offered prayer     Plan of Care: A  will continue to visit as able or per request by patient/family/staff.         Santos Beyer MDiv, [facility]

## 2021-11-15 NOTE — PROGRESS NOTES
[facility]    Medicine Progress Note - Hospitalist Service       Date of Admission:  11/13/2021    Assessment & Plan           Nelly Alaniz is a 81 year old female admitted on 11/13/2021. 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  Continue Zosyn given possible pus spillage intraop. Monitor drain output     #History of DVT/PE  SCDs  Resume home Xarelto when ok by surgeon  Patient with possible calf tenderness without skin changes being monitored by RN, would get US if worsening     #Mood disorder  Resume home Topamax, Wellbutrin     #Elevated blood pressure without diagnosis of hypertension  Probably due to pain, anxiety  Monitor     #MONIKA, not on CPAP       Diet: Advance Diet as Tolerated: Clear Liquid Diet    DVT Prophylaxis: Moderate risk. SCDs   Valle Catheter: Not present  Central Lines: None  Code Status: Full Code      Disposition Plan   Disposition: Home when ready, likely 2 days  Discharge barriers: postop recovery, monitor drain output, OT/PT  Medically ready to discharge today: No  Estimated discharge date: 11/17/2021     The patient's care was discussed with the Patient and Patient's Family.    Urvashi Egan MD  Hospitalist Service  [facility]  Text page via [employer] Paging/Directory      Clinically Significant Risk Factors Present on Admission                ____________        Physical Exam   Vital Signs: Temp: 98.2  F (36.8  C) Temp src: Oral BP: 116/56 Pulse: 76   Resp: 16 SpO2: 94 % O2 Device: None (Room air) Oxygen Delivery: 6 LPM  Weight: 0 lbs 0 oz  General: in no apparent distress, non-toxic and fatigued appearing female lying  in hospital bed oriented x3  HEENT: Head normocephalic atraumatic, oral mucosa moist. Sclerae anicteric  Skin: No rashes or lesions  Extremities: 2+ pitting edema bilateral ankles, wearing compression stockings  Psych: Normal affect, mood mildly dysthymic  Neuro: CNII-XII grossly intact, moving all 4 extremities    Data   No results found for this or any previous visit (from the past 24 hour(s)).  ____________  Interval History   Data reviewed today: I reviewed all medications, new labs and imaging results over the last 24 hours. I personally reviewed no images or EKG's today.  patient states doing the same today. no new complaints. has been refusing to wear SCDs overnight, states they were driving her  bonkers.  is wearing them now. but states this is why she cannot get out of bed right now- has not been out of bed evidenced by a lot of junk piled on her chair. told her she should get out of bed and should wear SCDs. she states she understands these are for clot prevention and that she is higher risk right now. surgery later today. not ready for discharge.    Update I called patient's room trying to update family but they were not there.  I spoke briefly with Nelly she states she is doing okay.    I called and updated daughter In law Adrianne

## 2021-11-15 NOTE — ANESTHESIA POSTPROCEDURE EVALUATION
Patient: Nelly Alaniz    Procedure: Procedure(s):  CHOLECYSTECTOMY, LAPAROSCOPIC       Diagnosis:Acute cholecystitis [K81.0]  Diagnosis Additional Information: No value filed.    Anesthesia Type:  General    Note:  Disposition: Outpatient   Postop Pain Control: Uneventful            Sign Out: Well controlled pain   PONV: No   Neuro/Psych: Uneventful            Sign Out: Acceptable/Baseline neuro status   Airway/Respiratory: Uneventful            Sign Out: Acceptable/Baseline resp. status   CV/Hemodynamics: Uneventful            Sign Out: Acceptable CV status; No obvious hypovolemia; No obvious fluid overload   Other NRE: NONE   DID A NON-ROUTINE EVENT OCCUR? No           Last vitals:  Vitals Value Taken Time   /57 11/15/21 1410   Temp 37.1  C (98.7  F) 11/15/21 1410   Pulse 75 11/15/21 1419   Resp 19 11/15/21 1416   SpO2 97 % 11/15/21 1419   Vitals shown include unvalidated device data.    Electronically Signed By: Norm Ferguson MD  November 15, 2021  2:19 PM

## 2021-11-15 NOTE — ANESTHESIA PROCEDURE NOTES
Airway       Patient location during procedure: OR       Procedure Start/Stop Times: 11/15/2021 11:59 AM  Staff -        Anesthesiologist:  Melanie Raygoza MD       CRNA: Thu Olson APRN CRNA       Performed By: CRNA  Consent for Airway        Urgency: elective  Indications and Patient Condition       Indications for airway management: alfredo-procedural       Induction type:intravenous       Mask difficulty assessment: 2 - vent by mask + OA or adjuvant +/- NMBA    Final Airway Details       Final airway type: endotracheal airway       Successful airway: ETT - single  Endotracheal Airway Details        ETT size (mm): 7.0       Cuffed: yes       Successful intubation technique: direct laryngoscopy       DL Blade Type: Bueno 2       Grade View of Cords: 1       Adjucts: stylet and tooth guard       Position: Right       Measured from: gums/teeth       Secured at (cm): 20       Bite block used: None    Post intubation assessment        Placement verified by: capnometry, equal breath sounds and chest rise        Number of attempts at approach: 1       Secured with: silk tape       Ease of procedure: easy       Dentition: Intact and Unchanged

## 2021-11-15 NOTE — ANESTHESIA PREPROCEDURE EVALUATION
Anesthesia Pre-Procedure Evaluation    Patient: Nelly Alaniz   MRN: [id number] : 1940        Preoperative Diagnosis: Acute cholecystitis [K81.0]    Procedure : Procedure(s):  CHOLECYSTECTOMY, LAPAROSCOPIC          Past Medical History:   Diagnosis Date     Depressive disorder      Obesity      Pulmonary emboli (H)       Past Surgical History:   Procedure Laterality Date     HYSTERECTOMY        No Known Allergies   Social History     Tobacco Use     Smoking status: Never Smoker     Smokeless tobacco: Not on file   Substance Use Topics     Alcohol use: Not Currently      Wt Readings from Last 1 Encounters:   No data found for Wt        Anesthesia Evaluation   Pt has had prior anesthetic.     History of anesthetic complications  - PONV.      ROS/MED HX  ENT/Pulmonary:     (+) sleep apnea, doesn't use CPAP,     Neurologic:  - neg neurologic ROS     Cardiovascular:  - neg cardiovascular ROS     METS/Exercise Tolerance: 3 - Able to walk 1-2 blocks without stopping    Hematologic:     (+) History of blood clots, pt is anticoagulated,     Musculoskeletal:   (+) arthritis,     GI/Hepatic:  - neg GI/hepatic ROS     Renal/Genitourinary:  - neg Renal ROS     Endo:     (+) Obesity,     Psychiatric/Substance Use:  - neg psychiatric ROS     Infectious Disease:  - neg infectious disease ROS     Malignancy:       Other:            Physical Exam    Airway        Mallampati: III   TM distance: > 3 FB   Neck ROM: full     Respiratory Devices and Support         Dental       (+) chipped      Cardiovascular   cardiovascular exam normal          Pulmonary   pulmonary exam normal                OUTSIDE LABS:  CBC:   Lab Results   Component Value Date    WBC 12.3 (H) 2021    HGB 15.0 2021    HCT 44.9 2021     2021     BMP:   Lab Results   Component Value Date     2021    POTASSIUM 3.6 2021    CHLORIDE 110 (H) 2021    CO2 22 2021    BUN 16 2021    CR 0.82 2021      11/13/2021     COAGS:   Lab Results   Component Value Date    INR 1.35 (H) 11/13/2021     POC: No results found for: BGM, HCG, HCGS  HEPATIC:   Lab Results   Component Value Date    ALBUMIN 3.7 11/13/2021    PROTTOTAL 6.7 11/13/2021    ALT 16 11/13/2021    AST 17 11/13/2021    ALKPHOS 76 11/13/2021    BILITOTAL 0.8 11/13/2021     OTHER:   Lab Results   Component Value Date    FRANCISCO 9.5 11/13/2021    LIPASE <9 11/13/2021       Anesthesia Plan    ASA Status:  3   NPO Status:  NPO Appropriate    Anesthesia Type: General.     - Airway: ETT      Maintenance: TIVA.        Consents    Anesthesia Plan(s) and associated risks, benefits, and realistic alternatives discussed. Questions answered and patient/representative(s) expressed understanding.     - Discussed with:  Patient         Postoperative Care    Pain management: Multi-modal analgesia.   PONV prophylaxis: Ondansetron (or other 5HT-3), Dexamethasone or Solumedrol     Comments:        Esmolol  Robinul  Zofran, decadron 10 mg                Melanie Raygoza MD

## 2021-11-15 NOTE — ANESTHESIA CARE TRANSFER NOTE
Patient: Nelly Alaniz    Procedure: Procedure(s):  CHOLECYSTECTOMY, LAPAROSCOPIC       Diagnosis: Acute cholecystitis [K81.0]  Diagnosis Additional Information: No value filed.    Anesthesia Type:   General     Note:    Oropharynx: oropharynx clear of all foreign objects and spontaneously breathing  Level of Consciousness: drowsy  Oxygen Supplementation: face mask  Level of Supplemental Oxygen (L/min / FiO2): 6  Independent Airway: airway patency satisfactory and stable  Dentition: dentition unchanged  Vital Signs Stable: post-procedure vital signs reviewed and stable  Report to RN Given: handoff report given  Patient transferred to: PACU    Handoff Report: Identifed the Patient, Identified the Reponsible Provider, Reviewed the pertinent medical history, Discussed the surgical course, Reviewed Intra-OP anesthesia mangement and issues during anesthesia, Set expectations for post-procedure period and Allowed opportunity for questions and acknowledgement of understanding      Vitals:  Vitals Value Taken Time   /59 11/15/21 1330   Temp 99.2 F 11/15/21  1330   Pulse 76 11/15/21 1332   Resp 17 11/15/21 1332   SpO2 100 % 11/15/21 1332   Vitals shown include unvalidated device data.    Electronically Signed By: BLAYNE Link CRNA  November 15, 2021  1:32 PM

## 2021-11-15 NOTE — OP NOTE
Name:  Nelly Alaniz  PCP:  Tapan Tran  Procedure Date:  11/13/2021 - 11/15/2021      Procedure(s):  CHOLECYSTECTOMY, LAPAROSCOPIC    Pre-Procedure Diagnosis:  Acute cholecystitis [K81.0]     Post-Procedure Diagnosis:    Purulence acute on chronic cholecystitis    Surgeon(s):  Peter Albright DO    Circulator: Lorena Cabral RN; Kamilla Soto RN  Relief Circulator: Rei Melendez RN  Scrub Person: Nikki Bella; Nikky Dinero    Anesthesia Type:  GET      Findings:  Acute on chronic cholecystitis with purulent bile    Operative Report:    The patient was taken to the operating room and placed in a supine position.  SCDs were placed on bilateral lower extremities.  Antibiotics were given prior to skin incision.  The abdomen was prepped and draped in a sterile fashion.    I began the first portion of the procedure by injecting quarter percent Marcaine with epinephrine into the supraumbilical position.  I then made a 5 mm transverse incision above the umbilicus and established a pneumoperitoneum using a Veress needle technique.  Once the pneumoperitoneum was established,I placed a 5 mm trocar with a 5 mm 30  camera into the abdomen.  The surrounding structures were scrutinized for any injuries upon entry.  I did not find any. I placed an 11 mm trocar in the subxiphoid position as well as 2 right upper quadrant 5 mm trochars under direct visualization.  All trochars were placed after local anesthetic was injected to the fascial layers.    I first took down several adhesions of the omentum surrounding the gallbladder.  This was done with LigaSure sealing device.  The gallbladder was extremely distended and I eventually decompressed this with a suction catheter.  There was noted to be purulence coming out of the gallbladder at this time.  I then had my assistant retract the gallbladder cephalad and I persisted to dissect out the cystic duct and cystic artery in the standard fashion using blunt  dissection and Bovie electrocautery.  There was a fair amount of inflammation at the infundibulum cystic duct junction.  Because of this I elected to transect the infundibulum just proximal cystic duct.  Once this was complete the cystic duct was visualized from within the gallbladder infundibulum.  I then remove the remaining gallbladder off the gallbladder fossa with electrocautery.  I then placed the gallbladder into an Endo Catch bag and brought it out through the subxiphoid port site incision.  I then placed 2-0 Vicryl Endoloops around the cystic duct infundibulum.  Next I achieved hemostasis using Bovie electrocautery, Surgicel and scrutinized the surgical area for any ongoing bleeding.  None was found.  I then placed a 15 Sukhdev drain through the right upper quadrant 5 mm trocar given the fact that there was pus in the gallbladder.  This was sutured to the abdomen with a drain stitch.  I then closed the 11 mm subxiphoid port site incision using a Barrera needle and an 0 Vicryl suture.  Once this was complete I removed all trochars and desufflated the abdomen.  I then injected local anesthetic and all fascial layers and reapproximated the skin edges of all 4 trocar sites with 4-0 Monocryl subcuticular sutures.  The wounds were then cleaned and covered with dry sterile dressings.  All sponge counts and needle counts were correct at the end of the procedure.  Estimated Blood Loss:   15cc    Specimens:    ID Type Source Tests Collected by Time Destination   1 :  Tissue Gallbladder SURGICAL PATHOLOGY EXAM Peter Albright DO 11/15/2021  1:19 PM           Drains:   Closed/Suction Drain 1 Lateral RUQ Bulb 15 Uzbek (Active)       Complications:    None    Peter Albright DO   Skin normal color for race, warm, dry and intact. No evidence of rash.

## 2021-11-15 NOTE — PLAN OF CARE
Problem: Adult Inpatient Plan of Care  Goal: Plan of Care Review  Outcome: Improving  Goal: Patient-Specific Goal (Individualized)  Outcome: Improving  Goal: Absence of Hospital-Acquired Illness or Injury  Outcome: Improving  Intervention: Identify and Manage Fall Risk  Recent Flowsheet Documentation  Taken 11/15/2021 0010 by Bessie Gomes, RN  Safety Promotion/Fall Prevention: bed alarm on  Intervention: Prevent Skin Injury  Recent Flowsheet Documentation  Taken 11/15/2021 0010 by Bessie Gomes, RN  Body Position: position changed independently  Goal: Optimal Comfort and Wellbeing  Outcome: Improving  Goal: Readiness for Transition of Care  Outcome: Improving     Problem: Pain Acute  Goal: Acceptable Pain Control and Functional Ability  Outcome: Improving  Intervention: Develop Pain Management Plan  Recent Flowsheet Documentation  Taken 11/15/2021 0010 by Bessie Gomes, RN  Pain Management Interventions: declines     Problem: Risk for Delirium  Goal: Optimal Coping  Outcome: Improving  Goal: Improved Behavioral Control  Outcome: Improving  Goal: Improved Attention and Thought Clarity  Outcome: Improving  Goal: Improved Sleep  Outcome: Improving     Pt a/o with pain and nausea controlled by dilaudid and zofran. Pt npo since midnight for surgery today at 12:30. Iv antibiotics given as ordered. Will continue to monitor.

## 2021-11-16 ENCOUNTER — APPOINTMENT (OUTPATIENT)
Dept: OCCUPATIONAL THERAPY | Facility: HOSPITAL | Age: 81
DRG: 419 | End: 2021-11-16
Attending: HOSPITALIST
Payer: MEDICARE

## 2021-11-16 ENCOUNTER — APPOINTMENT (OUTPATIENT)
Dept: PHYSICAL THERAPY | Facility: HOSPITAL | Age: 81
DRG: 419 | End: 2021-11-16
Attending: HOSPITALIST
Payer: MEDICARE

## 2021-11-16 LAB
ANION GAP SERPL CALCULATED.3IONS-SCNC: 4 MMOL/L (ref 5–18)
BUN SERPL-MCNC: 21 MG/DL (ref 8–28)
CALCIUM SERPL-MCNC: 9 MG/DL (ref 8.5–10.5)
CHLORIDE BLD-SCNC: 107 MMOL/L (ref 98–107)
CO2 SERPL-SCNC: 26 MMOL/L (ref 22–31)
CREAT SERPL-MCNC: 0.81 MG/DL (ref 0.6–1.1)
ERYTHROCYTE [DISTWIDTH] IN BLOOD BY AUTOMATED COUNT: 12.6 % (ref 10–15)
GFR SERPL CREATININE-BSD FRML MDRD: 67 ML/MIN/1.73M2
GLUCOSE BLD-MCNC: 117 MG/DL (ref 70–125)
GLUCOSE BLDC GLUCOMTR-MCNC: 112 MG/DL (ref 70–99)
HCT VFR BLD AUTO: 36.4 % (ref 35–47)
HGB BLD-MCNC: 12.1 G/DL (ref 11.7–15.7)
MCH RBC QN AUTO: 32.2 PG (ref 26.5–33)
MCHC RBC AUTO-ENTMCNC: 33.2 G/DL (ref 31.5–36.5)
MCV RBC AUTO: 97 FL (ref 78–100)
PATH REPORT.COMMENTS IMP SPEC: NORMAL
PATH REPORT.COMMENTS IMP SPEC: NORMAL
PATH REPORT.FINAL DX SPEC: NORMAL
PATH REPORT.GROSS SPEC: NORMAL
PATH REPORT.MICROSCOPIC SPEC OTHER STN: NORMAL
PATH REPORT.RELEVANT HX SPEC: NORMAL
PHOTO IMAGE: NORMAL
PLATELET # BLD AUTO: 171 10E3/UL (ref 150–450)
POTASSIUM BLD-SCNC: 3.5 MMOL/L (ref 3.5–5)
RBC # BLD AUTO: 3.76 10E6/UL (ref 3.8–5.2)
SODIUM SERPL-SCNC: 137 MMOL/L (ref 136–145)
WBC # BLD AUTO: 11 10E3/UL (ref 4–11)

## 2021-11-16 PROCEDURE — 250N000011 HC RX IP 250 OP 636: Performed by: HOSPITALIST

## 2021-11-16 PROCEDURE — 97116 GAIT TRAINING THERAPY: CPT | Mod: GP | Performed by: PHYSICAL THERAPIST

## 2021-11-16 PROCEDURE — 97530 THERAPEUTIC ACTIVITIES: CPT | Mod: GP | Performed by: PHYSICAL THERAPIST

## 2021-11-16 PROCEDURE — 85027 COMPLETE CBC AUTOMATED: CPT | Performed by: HOSPITALIST

## 2021-11-16 PROCEDURE — 250N000013 HC RX MED GY IP 250 OP 250 PS 637: Performed by: SURGERY

## 2021-11-16 PROCEDURE — 120N000001 HC R&B MED SURG/OB

## 2021-11-16 PROCEDURE — 250N000011 HC RX IP 250 OP 636: Performed by: SURGERY

## 2021-11-16 PROCEDURE — 99024 POSTOP FOLLOW-UP VISIT: CPT | Performed by: PHYSICIAN ASSISTANT

## 2021-11-16 PROCEDURE — 99231 SBSQ HOSP IP/OBS SF/LOW 25: CPT | Performed by: HOSPITALIST

## 2021-11-16 PROCEDURE — 80048 BASIC METABOLIC PNL TOTAL CA: CPT | Performed by: HOSPITALIST

## 2021-11-16 PROCEDURE — 97535 SELF CARE MNGMENT TRAINING: CPT | Mod: GO

## 2021-11-16 PROCEDURE — 88304 TISSUE EXAM BY PATHOLOGIST: CPT | Mod: 26 | Performed by: PATHOLOGY

## 2021-11-16 PROCEDURE — 36415 COLL VENOUS BLD VENIPUNCTURE: CPT | Performed by: HOSPITALIST

## 2021-11-16 PROCEDURE — 97162 PT EVAL MOD COMPLEX 30 MIN: CPT | Mod: GP | Performed by: PHYSICAL THERAPIST

## 2021-11-16 PROCEDURE — 250N000013 HC RX MED GY IP 250 OP 250 PS 637: Performed by: HOSPITALIST

## 2021-11-16 PROCEDURE — 97165 OT EVAL LOW COMPLEX 30 MIN: CPT | Mod: GO

## 2021-11-16 RX ADMIN — BUPROPION HYDROCHLORIDE 300 MG: 300 TABLET, EXTENDED RELEASE ORAL at 08:28

## 2021-11-16 RX ADMIN — HYDROMORPHONE HYDROCHLORIDE 0.2 MG: 0.2 INJECTION, SOLUTION INTRAMUSCULAR; INTRAVENOUS; SUBCUTANEOUS at 03:57

## 2021-11-16 RX ADMIN — TOPIRAMATE 50 MG: 25 TABLET ORAL at 08:27

## 2021-11-16 RX ADMIN — ACETAMINOPHEN 975 MG: 325 TABLET ORAL at 06:28

## 2021-11-16 RX ADMIN — OXYCODONE HYDROCHLORIDE 5 MG: 5 TABLET ORAL at 08:25

## 2021-11-16 RX ADMIN — SENNOSIDES, DOCUSATE SODIUM 1 TABLET: 8.6; 5 TABLET ORAL at 21:56

## 2021-11-16 RX ADMIN — RIVAROXABAN 20 MG: 10 TABLET, FILM COATED ORAL at 21:56

## 2021-11-16 RX ADMIN — TOPIRAMATE 50 MG: 25 TABLET ORAL at 21:57

## 2021-11-16 RX ADMIN — SENNOSIDES, DOCUSATE SODIUM 1 TABLET: 8.6; 5 TABLET ORAL at 08:28

## 2021-11-16 RX ADMIN — PIPERACILLIN SODIUM AND TAZOBACTAM SODIUM 3.38 G: 3; .375 INJECTION, POWDER, LYOPHILIZED, FOR SOLUTION INTRAVENOUS at 06:28

## 2021-11-16 RX ADMIN — ACETAMINOPHEN 975 MG: 325 TABLET ORAL at 14:27

## 2021-11-16 RX ADMIN — POLYETHYLENE GLYCOL 3350 17 G: 17 POWDER, FOR SOLUTION ORAL at 08:26

## 2021-11-16 RX ADMIN — PIPERACILLIN SODIUM AND TAZOBACTAM SODIUM 3.38 G: 3; .375 INJECTION, POWDER, LYOPHILIZED, FOR SOLUTION INTRAVENOUS at 22:01

## 2021-11-16 RX ADMIN — Medication 1 TABLET: at 08:28

## 2021-11-16 RX ADMIN — OXYCODONE HYDROCHLORIDE 5 MG: 5 TABLET ORAL at 14:22

## 2021-11-16 RX ADMIN — PIPERACILLIN SODIUM AND TAZOBACTAM SODIUM 3.38 G: 3; .375 INJECTION, POWDER, LYOPHILIZED, FOR SOLUTION INTRAVENOUS at 14:21

## 2021-11-16 RX ADMIN — ACETAMINOPHEN 975 MG: 325 TABLET ORAL at 22:00

## 2021-11-16 RX ADMIN — Medication 1 TABLET: at 21:56

## 2021-11-16 ASSESSMENT — ACTIVITIES OF DAILY LIVING (ADL)
ADLS_ACUITY_SCORE: 11
ADLS_ACUITY_SCORE: 9
ADLS_ACUITY_SCORE: 9
ADLS_ACUITY_SCORE: 11
ADLS_ACUITY_SCORE: 9
ADLS_ACUITY_SCORE: 11
ADLS_ACUITY_SCORE: 9
ADLS_ACUITY_SCORE: 11
ADLS_ACUITY_SCORE: 11
ADLS_ACUITY_SCORE: 9
ADLS_ACUITY_SCORE: 11
PREVIOUS_RESPONSIBILITIES: HOUSEKEEPING;MEDICATION MANAGEMENT;FINANCES
ADLS_ACUITY_SCORE: 11
ADLS_ACUITY_SCORE: 9
ADLS_ACUITY_SCORE: 11
ADLS_ACUITY_SCORE: 11

## 2021-11-16 NOTE — PLAN OF CARE
Problem: Bleeding (Surgery Nonspecified)  Goal: Absence of Bleeding  Outcome: Improving   Up to chair and bathroom. Voiding. Burping but no flatus. Hypoactive bowel sounds. Denies nausea or vomit. Tolerating clear liquid diet. Abdomen soft and tender. Afebrile. On zosyn.

## 2021-11-16 NOTE — PLAN OF CARE
Problem: Adult Inpatient Plan of Care  Goal: Plan of Care Review  Outcome: Improving     Problem: Pain Acute  Goal: Acceptable Pain Control and Functional Ability  Outcome: Improving     Post op day 1 from lap jeana. Patient is alert, oriented and pleasant. Vitals are stable with good oxygen sat on RA. #3 lap sites clean, dry and intact. RODOLFO drain in place, patent, draining and  attached to bulb suction. 10ml of serosanguineous  output noted t/o the shift.  Intermittent abdominal pain observed, good relief obtained from dilaudid IV.   Tolerating clear liquid diet, denies nausea or vomiting.  Reports not passing flatus yet but is burping. Ambulating with 1 assist with a cane. Voided adequately.   Uses the call light appropriately.

## 2021-11-16 NOTE — PROGRESS NOTES
Care Management Follow Up    Length of Stay (days): 2    Expected Discharge Date: 11/18/2021     Concerns to be Addressed:  POD #1, RODOLFO drain, IV abx, PT/OT, ADAT    Patient plan of care discussed at interdisciplinary rounds: Yes    Anticipated Discharge Disposition: Home, Home Care     Anticipated Discharge Services:  PT/OT, RN, HHA  Anticipated Discharge DME:  Per therapy recommendations    Patient/family educated on Medicare website which has current facility and service quality ratings:    Education Provided on the Discharge Plan:  Per care team  Patient/Family in Agreement with the Plan:  Plan is in progress    Referrals Placed by CM/SW: [facility]  Private pay costs discussed: Not applicable    Additional Information:  Chart reviewed and updates with care team done.    Therapy recommending TCU to maximize patient independence.  If unable to discharge to TCU, recommendation is daily in-person checks from family, neighbors, friends, and use of 4WW at all times.     RNCM met with patient to introduce self and review role of care manager. Discussed therapy recommendations.  Patient declining TCU but is agreeable with home care.  Referral sent and patient accepted by Whit in intake at [facility].  Nelly states that her daughter-in-law, Brad will provide transport  Home when discharged.    Writer called and spoke with daughter-in-law, Healther, to introduce self and discuss discharge recommendations and plan.  Adrianne states she plans to talk with patient later today and will discuss discharge plan.  Adrianne states she is more than happy to help out but can not commit to daily check-in in person by herself.. She states that neighbors could check in but they are elderly and not able to provide any personal cares.  Provided CM contact information and requested Adrianne to call with any updates after she visits or talks with Nelly.     Lisa Ornelas, RN

## 2021-11-16 NOTE — PLAN OF CARE
Problem: Pain Acute  Goal: Acceptable Pain Control and Functional Ability  Outcome: Improving   Pt's pain well managed with scheduled tylenol and prn oxycodone. RODOLFO bulb intact. 3-lap sites D/I.

## 2021-11-16 NOTE — PROGRESS NOTES
Occupational Therapy      11/16/21 0730   Quick Adds   Type of Visit Initial Occupational Therapy Evaluation   Living Environment   People in home alone   Current Living Arrangements house   Home Accessibility wheelchair accessible;stairs within home  (Ramped entrance )   Number of Stairs, Within Home, Primary greater than 10 stairs   Stair Railings, Within Home, Primary railings safe and in good condition   Transportation Anticipated family or friend will provide;car, drives self   Living Environment Comments Pt stated laundry is in basement but family does laundry. Bed/bath/kitchen on one level. Tub/shower w/ shower chair/GB, Standard toilet w/ GB.    Self-Care   Usual Activity Tolerance moderate   Current Activity Tolerance fair   Equipment Currently Used at Home cane, straight;walker, rolling   Activity/Exercise/Self-Care Comment Ind. in dressing/bathing/toileting   Instrumental Activities of Daily Living (IADL)   Previous Responsibilities housekeeping;medication management;finances   IADL Comments Pt gets meals on wheels 2x week, pt family assists w/ groceries.    Disability/Function   Hearing Difficulty or Deaf yes   Wear Glasses or Blind yes   Vision Management glasses    Difficulty Eating/Swallowing no   Walking or Climbing Stairs Difficulty yes   Walking or Climbing Stairs ambulation difficulty, requires equipment   Dressing/Bathing Difficulty no   Toileting issues no   Doing Errands Independently Difficulty (such as shopping) yes   Errands Management Family assists w/ errands    Fall history within last six months no   General Information   Onset of Illness/Injury or Date of Surgery 11/13/21   Referring Physician Tapan Morocho    Patient/Family Therapy Goal Statement (OT) Goal is to get back home    Additional Occupational Profile Info/Pertinent History of Current Problem 81 y.o hx of DVT/PE, anticoagulant, sleep apnea, mood disorder, presenting for evaluation of abdominal pain found to have acute  cholecysitis s/p cholecytectomy lap.    Existing Precautions/Restrictions no known precautions/restrictions   Cognitive Status Examination   Orientation Status orientation to person, place and time   Visual Perception   Visual Impairment/Limitations WFL   Range of Motion Comprehensive   General Range of Motion no range of motion deficits identified   Strength Comprehensive (MMT)   General Manual Muscle Testing (MMT) Assessment no strength deficits identified   Bed Mobility   Bed Mobility sit-supine;supine-sit   Supine-Sit [place] (Bed Mobility) verbal cues;supervision   Sit-Supine [place] (Bed Mobility) supervision;verbal cues   Assistive Device (Bed Mobility) bed rails   Transfers   Transfers bed-chair transfer;sit-stand transfer   Transfer Skill: Bed to Chair/Chair to Bed   Bed-Chair [place] (Transfers) supervision;contact guard;verbal cues   Assistive Device (Bed-Chair Transfers) straight cane   Sit-Stand Transfer   Sit-Stand [place] (Transfers) supervision;contact guard;verbal cues   Assistive Device (Sit-Stand Transfers) cane, straight   Balance   Balance Assessment standing balance: static;standing balance: dynamic   Standing Balance: Static good balance   Standing Balance: Dynamic good balance   Activities of Daily Living   BADL Assessment upper body dressing;lower body dressing;bathing   Bathing Assessment   [place] Level (Bathing) set up;supervision   Position (Bathing) unsupported sitting   Upper Body Dressing Assessment   [place] Level (Upper Body Dressing) minimum assist (75% patient effort);moderate assist (50% patient effort);supervision;verbal cues   Position (Upper Body Dressing) unsupported sitting   Lower Body Dressing Assessment   [place] Level (Lower Body Dressing) supervision;verbal cues   Position (Lower Body Dressing) unsupported sitting   Clinical Impression   Criteria for Skilled Therapeutic Interventions Met (OT) yes;skilled treatment is necessary   OT  Diagnosis weakness, decreased endurance/overall balance.    OT Problem List-Impairments impacting ADL activity tolerance impaired;problems related to;balance;sensation;mobility   Assessment of Occupational Performance 1-3 Performance Deficits   Identified Performance Deficits Transfers/mobility, dressing, balance    Planned Therapy Interventions (OT) IADL retraining;ADL retraining;balance training;strengthening   Clinical Decision Making Complexity (OT) low complexity   Therapy Frequency (OT) Daily   Predicted Duration of Therapy 3   Risk & Benefits of therapy have been explained evaluation/treatment results reviewed;patient   OT Discharge Planning    OT Discharge Recommendation (DC Rec) Home with assist;home with home care occupational therapy;Transitional Care Facility   OT Rationale for DC Rec Pt requiring assistx1 for transfers at this time, pt lives alone and may require additional support upon d/c pt stated family does assist w/ some IADL tasks.    Total Evaluation Time (Minutes)   Total Evaluation Time (Minutes) 8

## 2021-11-16 NOTE — PROGRESS NOTES
[facility]    Medicine Progress Note - Hospitalist Service       Date of Admission:  11/13/2021    Assessment & Plan           Nelly Alaniz is a 81 year old female admitted on 11/13/2021. She has history of previous DVT/PE, anticoagulated on Xarelto, sleep apnea, mood disorder presented for evaluation of abdominal pain found to have acute cholecystitis     #Acute cholecystitis  With leukocytosis but no sepsis  Had lap jeana by Dr Albright 11/15, doing well postop  Continue Zosyn per surgeon, possible pus spillage intraop. Monitor drain output     #History of DVT/PE  SCDs  Resume home Xarelto, ok by surgeon     #Mood disorder  Resume home Topamax, Wellbutrin     #Elevated blood pressure without diagnosis of hypertension  Probably due to pain, anxiety  Resolved     #MONIKA, not on CPAP       Diet: Regular Diet Adult    DVT Prophylaxis: Moderate risk. SCDs   Valle Catheter: Not present  Central Lines: None  Code Status: Full Code      Disposition Plan   Disposition: Home vs TCU likely tomorrow  Discharge barriers: postop recovery, monitor drain output, IV abx, dispo clarification  Medically ready to discharge today: No  Estimated discharge date: 11/17/2021     The patient's care was discussed with the Patient and Patient's Family.    Urvashi Egan MD  Hospitalist Service  [facility]  Text page via [email or web address] Paging/Directory      Clinically Significant Risk Factors Present on Admission                ____________        Physical Exam   Vital Signs: Temp: 98  F (36.7  C) Temp src: Oral BP: 92/51 (notified nurse) Pulse: 60   Resp: 16 SpO2: 96 % O2 Device: None (Room air)    Weight: 182 lbs 11.2 oz  General: in no apparent distress, non-toxic and fatigued appearing female sittin in chair bed oriented x3, appears more energetic today  HEENT: Head normocephalic atraumatic, oral mucosa moist. Sclerae anicteric  Skin: No rashes or lesions  Extremities: 2+ pitting edema bilateral ankles,  wearing compression stockings  Psych: Normal affect, mood mildly dysthymic  Neuro: CNII-XII grossly intact, moving all 4 extremities    Data   Recent Results (from the past 24 hour(s))   CBC with platelets    Collection Time: 11/16/21  5:38 AM   Result Value Ref Range    WBC Count 11.0 4.0 - 11.0 10e3/uL    RBC Count 3.76 (L) 3.80 - 5.20 10e6/uL    Hemoglobin 12.1 11.7 - 15.7 g/dL    Hematocrit 36.4 35.0 - 47.0 %    MCV 97 78 - 100 fL    MCH 32.2 26.5 - 33.0 pg    MCHC 33.2 31.5 - 36.5 g/dL    RDW 12.6 10.0 - 15.0 %    Platelet Count 171 150 - 450 10e3/uL   Basic metabolic panel    Collection Time: 11/16/21  5:38 AM   Result Value Ref Range    Sodium 137 136 - 145 mmol/L    Potassium 3.5 3.5 - 5.0 mmol/L    Chloride 107 98 - 107 mmol/L    Carbon Dioxide (CO2) 26 22 - 31 mmol/L    Anion Gap 4 (L) 5 - 18 mmol/L    Urea Nitrogen 21 8 - 28 mg/dL    Creatinine 0.81 0.60 - 1.10 mg/dL    Calcium 9.0 8.5 - 10.5 mg/dL    Glucose 117 70 - 125 mg/dL    GFR Estimate 67 >60 mL/min/1.73m2   Glucose by meter    Collection Time: 11/16/21  6:40 AM   Result Value Ref Range    GLUCOSE BY METER POCT 112 (H) 70 - 99 mg/dL     ____________  Interval History   Data reviewed today: I reviewed all medications, new labs and imaging results over the last 24 hours. I personally reviewed no images or EKG's today.  Patient states she is feeling better today.  She is sitting up in the chair when I stop by and appears more vigorous.  She states still having some pain but is improved.  Tolerating diet advancement, happy about this.  Does not feel ready to go home today.    I called and updated MIYA Silver

## 2021-11-16 NOTE — PROGRESS NOTES
"CLINICAL NUTRITION SERVICES - ASSESSMENT NOTE     Nutrition Prescription    RECOMMENDATIONS FOR MDs/PROVIDERS TO ORDER:  None    Malnutrition Status:    Unable to assess    Recommendations already ordered by Registered Dietitian (RD):  mvi with minerals    Future/Additional Recommendations:  Add supplements if pt is unable to meet her nutrition needs     REASON FOR ASSESSMENT  Nelly Alaniz is a/an 81 year old female assessed by the dietitian for Admission Nutrition Risk Screen for positive with eating poorly d/t decreased appetite and \"unsure\" of weight loss    Pt presents post op day 1 from [facility] for acute cholecystitis  Hx Pulmonary embolism, depression    NUTRITION HISTORY  Per 4/8 primary office visit-pt was placed on topamax for desired weight loss with successful gradual weight loss  Nausea, abdominal pain x 2 days PTA    Pt has been busy with cares.     CURRENT NUTRITION ORDERS  Diet: Regular- advanced today from clears  Pt has been NPO/CL since admit x 3 days    Intake/Tolerance: Intake not documented  Ordering 1 clear liquid meal tray/day  Ordered 2 full regular diet meals today. Ate 100% of lunch at 581 kcal, 32 g protein    LABS  Labs reviewed    MEDICATIONS  Medications reviewed  Ocuvite, iv abx, miralax daily, pericolace bid    ANTHROPOMETRICS  Height: 156.2 cm (5' 1.5\")  Most Recent Weight: 82.9 kg (182 lb 11.2 oz) 11/15  3.1% weight loss x 2.5 months or less  IBW: 47.7 kg  BMI: Obesity Grade I BMI 30-34.9  Weight History:   85.3 kg (188 lb) 9/2/21  86.6 kg (191 lb) 4/8/21  90.3 kg (199 lb_ 10/6/20  91.6 kg (202 lb) 6/2/20  93 kg (205 lb) 3/9/20  97.1 kg (214 lb) 12/3/19  98.9 kg(218 lb) 9/3/19  99.8 kg (220 lb) 7/2/19  100.2kg (221 lb) 1/29/19  104.3 kg (230 b) 2/15/18  20.8% voluntary weight loss x 3 years - has been on topamax for weight loss per primary MD    Dosing Weight: 47.7 kg IBW    ASSESSED NUTRITION NEEDS  Estimated Energy Needs: 6007-2603 kcals/day (22-25 kcal/kg/day)  Justification: " Obese  Estimated Protein Needs: 95 grams protein/day (2g/kg/day)  Justification: Obesity guidelines and Post-op  Estimated Fluid Needs: 7212-8474 mL/day (25 - 30 mL/kg)   Justification: Maintenance    PHYSICAL FINDINGS  See malnutrition section below.    GI  Passing flatus.   Abdominal discomfort  Last BM 11/11  RODOLFO drain    MALNUTRITION:  % Weight Loss:  Weight loss does not meet criteria for malnutrition   % Intake:  </= 50% for >/= 5 days (severe malnutrition)  Subcutaneous Fat Loss:  Unable to assess at this time  Muscle Loss:  Unable to assess at this time  Fluid Retention:  None noted    NUTRITION DIAGNOSIS  Inadequate oral intake related to cholecystitis as evidenced by intake < 50% x 5 days (including 3 days NPO/CL)      INTERVENTIONS  Implementation  Multivitamin/mineral supplement therapy   Anticipate adequate intake if today's current good intake continues    Goals  Tolerate regular diet  Intake > 75% of estimated needs     Monitoring/Evaluation  Progress toward goals will be monitored and evaluated per protocol.

## 2021-11-16 NOTE — PROGRESS NOTES
11/16/21 1000   Quick Adds   Type of Visit Initial PT Evaluation   Living Environment   People in home alone   Current Living Arrangements house   Home Accessibility other (see comments);no concerns  (ramped entrance)   Self-Care   Equipment Currently Used at Home cane, straight;walker, rolling  (4WW)   Disability/Function   Walking or Climbing Stairs Difficulty yes   Walking or Climbing Stairs ambulation difficulty, requires equipment   Mobility Management Pt states she primarily uses 4WW for mobility due to chronic bilateral knee pain. Admits to using 4WW as wheelchair.   Dressing/Bathing Difficulty no   Toileting issues no   Fall history within last six months no   Change in Functional Status Since Onset of Current Illness/Injury yes   General Information   Onset of Illness/Injury or Date of Surgery 11/13/21   Referring Physician Urvashi Egan MD   Patient/Family Therapy Goals Statement (PT) None stated.   Pertinent History of Current Problem (include personal factors and/or comorbidities that impact the POC) Pt s/p lap jeana.   Existing Precautions/Restrictions fall   Pain Assessment   Patient Currently in Pain Yes, see Vital Sign flowsheet   Strength   Manual Muscle Testing Quick Adds Deficits observed during functional mobility   Bed Mobility   Bed Mobility sit-supine;scooting/bridging   Scooting/Bridging [place] (Bed Mobility) supervision   Sit-Supine [place] (Bed Mobility) supervision;verbal cues   Impairments Contributing to Impaired Bed Mobility pain;decreased strength   Assistive Device (Bed Mobility) bed rails   Transfers   Transfers sit-stand transfer   Impairments Contributing to Impaired Transfers pain;decreased strength   Sit-Stand Transfer   Sit-Stand [place] (Transfers) contact guard;verbal cues   Assistive Device (Sit-Stand Transfers) walker, 4-wheeled   Sit/Stand Transfer Comments Cues for safe hand placement.   Gait/Stairs (Locomotion)   [place] Level (Gait) contact guard    Assistive Device (Gait) walker, 4-wheeled   Distance in Feet (Required for LE Total Joints) 280'  (seated rest break at long-term point)   Pattern (Gait) step-through   Deviations/Abnormal Patterns (Gait) mateusz decreased;gait speed decreased  (forward flexed posture)   Clinical Impression   Criteria for Skilled Therapeutic Intervention yes, treatment indicated   PT Diagnosis (PT) impaired functional mobility   Influenced by the following impairments decreased strength, impaired balance, decreased endurance, decreased safety awareness.   Functional limitations due to impairments difficulty with transfers, ambulation   Clinical Presentation Evolving/Changing   Clinical Presentation Rationale Pt presents as medically diagnosed   Clinical Decision Making (Complexity) moderate complexity   Therapy Frequency (PT) 2x/day   Predicted Duration of Therapy Intervention (days/wks) 7 days   Planned Therapy Interventions (PT) balance training;bed mobility training;gait training;home exercise program;neuromuscular re-education;patient/family education;strengthening;transfer training   Risk & Benefits of therapy have been explained evaluation/treatment results reviewed;participants voiced agreement with care plan;participants included;patient   PT Discharge Planning    PT Discharge Recommendation (DC Rec) Transitional Care Facility;home with assist;home with home care physical therapy   PT Rationale for DC Rec Pt lives alone at baseline. Pt currently requires stand-by to contact guard assist for transfers. Anticipate patient will need assist for IADLs. Recommend TCU to maximize independence. If pt unable to discharge to TCU, recommend daily, in-person checks from family, neighbors, friends, and use of 4WW at all times.   Total Evaluation Time   Total Evaluation Time (Minutes) 8     Debi Schreiber, PT  11/16/2021

## 2021-11-17 ENCOUNTER — APPOINTMENT (OUTPATIENT)
Dept: OCCUPATIONAL THERAPY | Facility: HOSPITAL | Age: 81
DRG: 419 | End: 2021-11-17
Payer: MEDICARE

## 2021-11-17 ENCOUNTER — APPOINTMENT (OUTPATIENT)
Dept: PHYSICAL THERAPY | Facility: HOSPITAL | Age: 81
DRG: 419 | End: 2021-11-17
Payer: MEDICARE

## 2021-11-17 PROCEDURE — 97535 SELF CARE MNGMENT TRAINING: CPT | Mod: GO

## 2021-11-17 PROCEDURE — 250N000013 HC RX MED GY IP 250 OP 250 PS 637: Performed by: SURGERY

## 2021-11-17 PROCEDURE — 250N000011 HC RX IP 250 OP 636: Performed by: HOSPITALIST

## 2021-11-17 PROCEDURE — 250N000013 HC RX MED GY IP 250 OP 250 PS 637: Performed by: HOSPITALIST

## 2021-11-17 PROCEDURE — 99231 SBSQ HOSP IP/OBS SF/LOW 25: CPT | Performed by: HOSPITALIST

## 2021-11-17 PROCEDURE — 97116 GAIT TRAINING THERAPY: CPT | Mod: GP

## 2021-11-17 PROCEDURE — 97110 THERAPEUTIC EXERCISES: CPT | Mod: GP

## 2021-11-17 PROCEDURE — 99024 POSTOP FOLLOW-UP VISIT: CPT | Performed by: PHYSICIAN ASSISTANT

## 2021-11-17 PROCEDURE — 120N000001 HC R&B MED SURG/OB

## 2021-11-17 RX ORDER — BISACODYL 10 MG
10 SUPPOSITORY, RECTAL RECTAL ONCE
Status: COMPLETED | OUTPATIENT
Start: 2021-11-17 | End: 2021-11-17

## 2021-11-17 RX ADMIN — OXYCODONE HYDROCHLORIDE 5 MG: 5 TABLET ORAL at 12:53

## 2021-11-17 RX ADMIN — OXYCODONE HYDROCHLORIDE 5 MG: 5 TABLET ORAL at 08:09

## 2021-11-17 RX ADMIN — Medication 1 TABLET: at 08:08

## 2021-11-17 RX ADMIN — ACETAMINOPHEN 975 MG: 325 TABLET ORAL at 06:04

## 2021-11-17 RX ADMIN — TOPIRAMATE 50 MG: 25 TABLET ORAL at 08:08

## 2021-11-17 RX ADMIN — PIPERACILLIN SODIUM AND TAZOBACTAM SODIUM 3.38 G: 3; .375 INJECTION, POWDER, LYOPHILIZED, FOR SOLUTION INTRAVENOUS at 06:04

## 2021-11-17 RX ADMIN — ACETAMINOPHEN 975 MG: 325 TABLET ORAL at 14:44

## 2021-11-17 RX ADMIN — TOPIRAMATE 50 MG: 25 TABLET ORAL at 20:08

## 2021-11-17 RX ADMIN — RIVAROXABAN 20 MG: 10 TABLET, FILM COATED ORAL at 16:05

## 2021-11-17 RX ADMIN — BUPROPION HYDROCHLORIDE 300 MG: 300 TABLET, EXTENDED RELEASE ORAL at 08:08

## 2021-11-17 RX ADMIN — BISACODYL 10 MG: 10 SUPPOSITORY RECTAL at 11:58

## 2021-11-17 RX ADMIN — Medication 1 TABLET: at 20:08

## 2021-11-17 RX ADMIN — SENNOSIDES, DOCUSATE SODIUM 1 TABLET: 8.6; 5 TABLET ORAL at 08:08

## 2021-11-17 RX ADMIN — ACETAMINOPHEN 975 MG: 325 TABLET ORAL at 22:38

## 2021-11-17 RX ADMIN — POLYETHYLENE GLYCOL 3350 17 G: 17 POWDER, FOR SOLUTION ORAL at 08:07

## 2021-11-17 ASSESSMENT — ACTIVITIES OF DAILY LIVING (ADL)
ADLS_ACUITY_SCORE: 10
ADLS_ACUITY_SCORE: 10
ADLS_ACUITY_SCORE: 11
ADLS_ACUITY_SCORE: 10
ADLS_ACUITY_SCORE: 10
ADLS_ACUITY_SCORE: 11
ADLS_ACUITY_SCORE: 10
ADLS_ACUITY_SCORE: 11
ADLS_ACUITY_SCORE: 10
ADLS_ACUITY_SCORE: 11
ADLS_ACUITY_SCORE: 10
ADLS_ACUITY_SCORE: 11
ADLS_ACUITY_SCORE: 10
ADLS_ACUITY_SCORE: 10
ADLS_ACUITY_SCORE: 11

## 2021-11-17 NOTE — PROGRESS NOTES
[facility]    Medicine Progress Note - Hospitalist Service       Date of Admission:  11/13/2021    Assessment & Plan           Nelly Alaniz is a 81 year old female admitted on 11/13/2021. She has history of previous DVT/PE, anticoagulated on Xarelto, sleep apnea, mood disorder presented for evaluation of abdominal pain found to have acute cholecystitis     #Acute cholecystitis  With leukocytosis but no sepsis  Had lap jeana by Dr Albright 11/15, doing well postop  Ok to stop Zosyn now per surgeons. Monitor drain output, continue RODOLFO for discharge     #History of DVT/PE  SCDs  Back on home Xarelto now     #Mood disorder  Continue home Topamax, Wellbutrin     #Elevated blood pressure without diagnosis of hypertension  Probably due to pain, anxiety  Resolved     #MONIKA, not on CPAP       Diet: Regular Diet Adult    DVT Prophylaxis: Moderate risk. SCDs   Valle Catheter: Not present  Central Lines: None  Code Status: Full Code      Disposition Plan   Disposition: TCU tomorrow  Discharge barriers: placement  Medically ready to discharge today: Yes  Estimated discharge date: 11/18/2021     The patient's care was discussed with the Patient.    Urvashi Egan MD  Hospitalist Service  [facility]  Text page via [employer] Paging/Directory      Clinically Significant Risk Factors Present on Admission                ____________        Physical Exam   Vital Signs: Temp: 97.8  F (36.6  C) Temp src: Oral BP: 117/62 Pulse: 62   Resp: 18 SpO2: 96 % O2 Device: None (Room air)    Weight: 182 lbs 11.2 oz  General: in no apparent distress, non-toxic and fatigued appearing female sitting in chair  oriented x3, appears well again today  HEENT: Head normocephalic atraumatic, oral mucosa moist. Sclerae anicteric  Skin: No rashes or lesions  Extremities: 2+ pitting edema bilateral ankles, wearing compression stockings  Psych: Normal affect, mood mildly dysthymic  Neuro: CNII-XII grossly intact, moving  all 4 extremities    Data   No results found for this or any previous visit (from the past 24 hour(s)).  ____________  Interval History   Data reviewed today: I reviewed all medications, new labs and imaging results over the last 24 hours. I personally reviewed no images or EKG's today.  patient doing ok today. not moving great and open to TCU now. feels needs to have BM, open to suppository. stable for discharge when bed found.

## 2021-11-17 NOTE — PROGRESS NOTES
Received a request from patient's care manager to assist with discharge planning. She is asking for patient and family transitional care (TCU) choices.  Met with patient to discuss the recommendation. She agrees with the plan for TCU but has no facility preferences.  provided a list of local transitional care units (which includes the medicare.gov website) for patient and family to review. She plans to call her sister Sia but asked writer to call her daughter-in-law Karyna to assist as well.   spoke with Karyna by telephone. She also agrees with the plan for TCU and also has no facility preferences.  will e-mail karyna with the lists of local transitional care units (which includes the medicare.gov website) for family to review. Her e-mail address is: [email or web address].     Dhara Thompson RN

## 2021-11-17 NOTE — PLAN OF CARE
Problem: Adult Inpatient Plan of Care  Goal: Plan of Care Review  Outcome: Improving  Goal: Patient-Specific Goal (Individualized)  Outcome: Improving  Goal: Absence of Hospital-Acquired Illness or Injury  Outcome: Improving  Intervention: Identify and Manage Fall Risk  Recent Flowsheet Documentation  Taken 11/17/2021 0015 by Bessie Gomes RN  Safety Promotion/Fall Prevention: activity supervised  Intervention: Prevent Skin Injury  Recent Flowsheet Documentation  Taken 11/17/2021 0015 by Bessie Gomes RN  Body Position: position changed independently  Goal: Optimal Comfort and Wellbeing  Outcome: Improving  Goal: Readiness for Transition of Care  Outcome: Improving     Problem: Pain Acute  Goal: Acceptable Pain Control and Functional Ability  Outcome: Improving  Intervention: Develop Pain Management Plan  Recent Flowsheet Documentation  Taken 11/17/2021 0015 by Bessie Gomes RN  Pain Management Interventions: heat applied     Problem: Risk for Delirium  Goal: Optimal Coping  Outcome: Improving  Goal: Improved Behavioral Control  Outcome: Improving  Goal: Improved Attention and Thought Clarity  Outcome: Improving  Goal: Improved Sleep  Outcome: Improving     Problem: Bleeding (Surgery Nonspecified)  Goal: Absence of Bleeding  Outcome: Improving     Problem: OT General Care Plan  Goal: Transfer (OT)  Description: Transfer (OT)  Outcome: Improving  Goal: Toilet Transfer/Toileting (OT)  Description: Toilet Transfer/Toileting (OT)  Outcome: Improving   Pt a/o with pain controlled by scheduled tylenol and heat packs. Pt able to ambulate to bathroom with sba and cane. Iv antibiotics given as ordered. Will continue to monitor.

## 2021-11-17 NOTE — DISCHARGE INSTRUCTIONS
From your Surgeon:    Follow up:  For straightforward laparoscopic procedures, our practice is to check-in with you over the phone a few days after your procedure.  If you would like a scheduled follow up appointment in clinic, please call us at [phone] to schedule an appointment at your convenience.  If you would prefer to follow up with us further by phone, please let us know so that we may contact you 2-3 weeks following your procedure.        Diet: Regular diet. Patients can have difficulty with constipation following surgery, due in part to the administration of narcotic medications.  If you are suffering with constipation, you should avoid foods such as hard cheeses or red meat.  Foods high in fiber are recommended.      Activity: You should continue to be active at home, including ambulating frequently.  If possible try to limit the amount of time spent in bed.    Restrictions: You should not lift greater than 15 pounds for 2 weeks, and will want to avoid strenuous physical activity for 1-2 weeks.  You should limit your physical activity if it causes you discomfort; however, this should resolve within 1-2 weeks.   Walking does not count as strenuous physical activity.  You are safe to walk up and down stairs.  Following 2 weeks you may resume all normal physical activity.    Work:  You can return to work once your surgical pain has resolved.  If you perform duties that require lifting, pushing or pulling anything greater than 15 pounds, then you would have to be on light duty for the immediate 2 weeks after your surgery (if your work allows light duty).  After 2 weeks, you can return to work without restrictions.    Wound care: You may remove your Band-aids after a period of 24 hours.  The small white strips on the incisions act like artificial scabs, and will begin to peel at the edges at around 5-7 days.  These can then be removed.  It is normal to have a small rim of red present around the incisions.  This should not, however, extend beyond 1/4 inch from the incision.  If your incisions become increasingly tender, red, or draining, please contact us.       Bathing: You may shower after 24 hours from surgery.  It is ok to get your incisions wet, but avoid rubbing them.  Avoid soaking in bath tubs, or swimming in lakes, pools, or streams for 2 weeks following surgery.

## 2021-11-17 NOTE — PROGRESS NOTES
ASSESSMENT:  1. Acute cholecystitis    2. RUQ abdominal pain    3. Leukocytosis, unspecified type        Nelly Alaniz is a 81 year old female who is s/p lap jeana with drain placement for purulence, on 11/15    PLAN:  -ADAT  -Continue RODOLFO drain at discharge  -Ok to stop abx  -Anticoagulation started  -Ok to discharge from surgical standpoint once medically stable and has placement, looking like will go to TCU. Surgery orders and recs placed.    Wil Au PA-C  Pager - [phone]  Phone - [phone]   General Surgery    SUBJECTIVE:   She is feeling ok, more sore today, but tolerable, tolerating diet, passing gas and BM    Patient Vitals for the past 24 hrs:   BP Temp Temp src Pulse Resp SpO2   11/17/21 0745 118/59 97.9  F (36.6  C) Oral 59 18 96 %   11/17/21 0052 132/66 98  F (36.7  C) Oral 62 16 95 %   11/16/21 1855 127/61 -- -- 57 -- --   11/16/21 1546 92/51 -- -- 60 -- --   11/16/21 1542 90/49 98  F (36.7  C) Oral 60 16 96 %   11/16/21 1150 100/51 98.6  F (37  C) Oral 58 16 96 %   11/16/21 0848 (!) 143/65 98  F (36.7  C) Oral 54 18 98 %        PHYSICAL EXAM:  GEN: No acute distress, comfortable  CV:RRR  ABD:soft, nondistended, mild ttp RUQ, dressings c/d/i  Drains: RODOLFO with serosanguinous output   EXT:No cyanosis, edema or obvious abnormalities    11/16 0700 - 11/17 0659  In: 508 [P.O.:505; I.V.:3]  Out: 80 [Drains:80]    Lab Results   Component Value Date    WBC 11.0 11/16/2021    HGB 12.1 11/16/2021    HCT 36.4 11/16/2021    MCV 97 11/16/2021     11/16/2021     INR/Prothrombin Time  Recent Labs   Lab 11/16/21  0538      CO2 26   BUN 21     Lab Results   Component Value Date    ALT 16 11/13/2021    AST 17 11/13/2021    ALKPHOS 76 11/13/2021

## 2021-11-17 NOTE — PLAN OF CARE
Problem: Risk for Delirium  Goal: Improved Sleep  Outcome: Improving     Problem: Pain Acute  Goal: Acceptable Pain Control and Functional Ability  Outcome: Improving   Pt pain managed with scheduled medications. Cares bundled to promote rest. RODOLFO drain had 30 mL output. Pt was able to ambulate to bathroom with stand by assistance and a cane. Pt is tolerating IV zosyn well. No other concerns noted.  Germain Jimenes RN  11/16/2021  11:01 PM

## 2021-11-17 NOTE — PROGRESS NOTES
Care Management Follow Up    Length of Stay (days): 3    Expected Discharge Date: 11/18/2021     Concerns to be Addressed:     POD #2, post-op progression, RODOLFO drain, continued therapies  Patient plan of care discussed at interdisciplinary rounds: Yes    Anticipated Discharge Disposition: TCU     Anticipated Discharge Services:  Skilled nursing , PT/OT  Anticipated Discharge DME:  Per recommendations    Patient/family educated on Medicare website which has current facility and service quality ratings:  yes  Education Provided on the Discharge Plan:  Yes, per care team  Patient/Family in Agreement with the Plan:  yes    Referrals Placed by CM/SW: [facility], [facility], [facility], [facility]  Private pay costs discussed: Not applicable    Additional Information:  Chart reviewed and updates with care team done.    Patient agreeable today with recommendation for TCU.  CM partner assisted with providing TCU list to patient and speaking with daughter-in-law, Adrianne.  Adrianne will be coming to visit with patient this afternoon and will review list with Nelly.    Writer followed up with patient for TCU choices and referrals have been sent to [facility], [facility], and [facility].  Will need PAS.  Family to provide transport when discharged.    CM to cancel referral to [facility] once TCU placement is confirmed.      Lisa Ornelas RN

## 2021-11-17 NOTE — PLAN OF CARE
Problem: Pain Acute  Goal: Acceptable Pain Control and Functional Ability  Outcome: Improving  Intervention: Develop Pain Management Plan  Recent Flowsheet Documentation  Taken 11/17/2021 0809 by Savanah Gar RN  Pain Management Interventions: medication (see MAR)     Problem: Adult Inpatient Plan of Care  Goal: Optimal Comfort and Wellbeing  Outcome: Improving   Patient c/o Abdominal pain 4/10, oxycodone given x 2 with relief to 2/10, Dulcolax suppository given with small BM returns, Had scant amt bleeding from hemorrhoid, Up ambulating in room with SBA and cane, RODOLFO output 20 ml this shift, bed and chair alarms on for safety

## 2021-11-18 ENCOUNTER — APPOINTMENT (OUTPATIENT)
Dept: PHYSICAL THERAPY | Facility: HOSPITAL | Age: 81
DRG: 419 | End: 2021-11-18
Payer: MEDICARE

## 2021-11-18 ENCOUNTER — APPOINTMENT (OUTPATIENT)
Dept: OCCUPATIONAL THERAPY | Facility: HOSPITAL | Age: 81
DRG: 419 | End: 2021-11-18
Payer: MEDICARE

## 2021-11-18 VITALS
DIASTOLIC BLOOD PRESSURE: 60 MMHG | BODY MASS INDEX: 33.62 KG/M2 | OXYGEN SATURATION: 96 % | SYSTOLIC BLOOD PRESSURE: 130 MMHG | TEMPERATURE: 98.2 F | HEART RATE: 67 BPM | WEIGHT: 182.7 LBS | RESPIRATION RATE: 16 BRPM | HEIGHT: 62 IN

## 2021-11-18 LAB — GLUCOSE BLDC GLUCOMTR-MCNC: 103 MG/DL (ref 70–99)

## 2021-11-18 PROCEDURE — 97110 THERAPEUTIC EXERCISES: CPT | Mod: GP

## 2021-11-18 PROCEDURE — 99239 HOSP IP/OBS DSCHRG MGMT >30: CPT | Performed by: INTERNAL MEDICINE

## 2021-11-18 PROCEDURE — 250N000013 HC RX MED GY IP 250 OP 250 PS 637: Performed by: SURGERY

## 2021-11-18 PROCEDURE — 97535 SELF CARE MNGMENT TRAINING: CPT | Mod: GO

## 2021-11-18 PROCEDURE — 99024 POSTOP FOLLOW-UP VISIT: CPT | Performed by: PHYSICIAN ASSISTANT

## 2021-11-18 PROCEDURE — 97116 GAIT TRAINING THERAPY: CPT | Mod: GP

## 2021-11-18 PROCEDURE — 250N000013 HC RX MED GY IP 250 OP 250 PS 637: Performed by: HOSPITALIST

## 2021-11-18 RX ORDER — ACETAMINOPHEN 325 MG/1
650 TABLET ORAL EVERY 6 HOURS PRN
Start: 2021-11-18

## 2021-11-18 RX ADMIN — Medication 1 TABLET: at 09:08

## 2021-11-18 RX ADMIN — BUPROPION HYDROCHLORIDE 300 MG: 300 TABLET, EXTENDED RELEASE ORAL at 09:04

## 2021-11-18 RX ADMIN — ACETAMINOPHEN 975 MG: 325 TABLET ORAL at 06:28

## 2021-11-18 RX ADMIN — TOPIRAMATE 50 MG: 25 TABLET ORAL at 09:04

## 2021-11-18 ASSESSMENT — ACTIVITIES OF DAILY LIVING (ADL)
ADLS_ACUITY_SCORE: 11
ADLS_ACUITY_SCORE: 10
ADLS_ACUITY_SCORE: 8
ADLS_ACUITY_SCORE: 10
ADLS_ACUITY_SCORE: 8
ADLS_ACUITY_SCORE: 8
ADLS_ACUITY_SCORE: 10
ADLS_ACUITY_SCORE: 11
ADLS_ACUITY_SCORE: 8
ADLS_ACUITY_SCORE: 10

## 2021-11-18 NOTE — PLAN OF CARE
Physical Therapy Discharge Summary    Reason for therapy discharge:    Discharged to transitional care facility.    Progress towards therapy goal(s). See goals on Care Plan in [facility] electronic health record for goal details.  Goals partially met.  Barriers to achieving goals:   discharge from facility.    Therapy recommendation(s):    Continued therapy is recommended.  Rationale/Recommendations:  to .improve functional mobility for returning home as pt lives alone.

## 2021-11-18 NOTE — PLAN OF CARE
Care Management Discharge Note    Discharge Date: 11/18/2021       Discharge Disposition: Transitional Care    Discharge Services:      Discharge DME: None    Discharge Transportation: family or friend will provide    Private pay costs discussed: Not applicable    PAS Confirmation Code:    Patient/family educated on Medicare website which has current facility and service quality ratings:      Education Provided on the Discharge Plan:    Persons Notified of Discharge Plans:  Adrianne HOLLIDAY  Patient/Family in Agreement with the Plan: yes    Handoff Referral Completed: Yes    Additional Information:  TCU today     Estrella Sweet RN

## 2021-11-18 NOTE — PLAN OF CARE
Problem: Adult Inpatient Plan of Care  Goal: Plan of Care Review  Outcome: Adequate for Discharge  Flowsheets  Taken 11/18/2021 1303 by Roselia Davis RN  Progress: improving  Taken 11/17/2021 2033 by Rima Fritz RN  Plan of Care Reviewed With:   patient   family  Goal: Patient-Specific Goal (Individualized)  Outcome: Adequate for Discharge  Flowsheets  Taken 11/17/2021 2033 by Rima Fritz RN  Anxieties, Fears or Concerns: regarding which TCU to choose  Taken 11/14/2021 0021 by Bessie Gomes RN  Individualized Care Needs: pain gone  Goal: Absence of Hospital-Acquired Illness or Injury  Outcome: Adequate for Discharge  Intervention: Identify and Manage Fall Risk  Recent Flowsheet Documentation  Taken 11/18/2021 0900 by Roselia Davis RN  Safety Promotion/Fall Prevention: (Simultaneous filing. User may be unaware of other data.) bed alarm on  Intervention: Prevent Skin Injury  Recent Flowsheet Documentation  Taken 11/18/2021 0900 by Roselia Davis RN  Body Position: (Simultaneous filing. User may be unaware of other data.) position changed independently  Goal: Optimal Comfort and Wellbeing  Outcome: Adequate for Discharge  Goal: Readiness for Transition of Care  Outcome: Adequate for Discharge

## 2021-11-18 NOTE — PLAN OF CARE
Problem: Adult Inpatient Plan of Care  Goal: Plan of Care Review  11/18/2021 0030 by Rima Fritz RN  Outcome: Improving  11/17/2021 2110 by Rima Fritz RN  Outcome: Improving  Flowsheets (Taken 11/17/2021 2033)  Plan of Care Reviewed With:    patient    family  Progress: improving  Goal: Patient-Specific Goal (Individualized)  11/18/2021 0030 by Rima Fritz RN  Outcome: Improving  Flowsheets (Taken 11/17/2021 2033)  Anxieties, Fears or Concerns: regarding which TCU to choose  11/17/2021 2110 by Rima Fritz RN  Outcome: Improving  Flowsheets (Taken 11/17/2021 2033)  Anxieties, Fears or Concerns: regarding which TCU to choose  Goal: Absence of Hospital-Acquired Illness or Injury  11/18/2021 0030 by Rima Fritz RN  Outcome: Improving  11/17/2021 2110 by Rima Fritz RN  Outcome: Improving  Intervention: Identify and Manage Fall Risk  Recent Flowsheet Documentation  Taken 11/17/2021 1555 by Rima Fritz RN  Safety Promotion/Fall Prevention:    assistive device/personal items within reach    clutter free environment maintained    safety round/check completed    room organization consistent    nonskid shoes/slippers when out of bed    patient and family education  Intervention: Prevent and Manage VTE (Venous Thromboembolism) Risk  Recent Flowsheet Documentation  Taken 11/17/2021 1555 by Rima Fritz RN  VTE Prevention/Management:    pneumatic compression device    fluids promoted    anticoagulant therapy maintained  Goal: Optimal Comfort and Wellbeing  11/18/2021 0030 by Rima Fritz RN  Outcome: Improving  11/17/2021 2110 by Rima Fritz RN  Outcome: Improving  Intervention: Provide Person-Centered Care  Recent Flowsheet Documentation  Taken 11/17/2021 1555 by Rima Fritz RN  Trust Relationship/Rapport:    care explained    choices provided    emotional support provided    empathic listening provided    questions answered    reassurance provided     thoughts/feelings acknowledged  Goal: Readiness for Transition of Care  Outcome: Improving  Flowsheets (Taken 11/18/2021 0030)  Anticipated Changes Related to Illness: inability to care for self  Transportation Anticipated: family or friend will provide  Concerns to be Addressed: discharge planning  Barriers to Discharge: Referrals sent to TCU's, pending placement.  Intervention: Mutually Develop Transition Plan  Recent Flowsheet Documentation  Taken 11/18/2021 0030 by Rima Fritz, RN  Anticipated Changes Related to Illness: inability to care for self  Transportation Anticipated: family or friend will provide  Concerns to be Addressed: discharge planning     Problem: Pain Acute  Goal: Acceptable Pain Control and Functional Ability  Outcome: Improving  Intervention: Develop Pain Management Plan  Recent Flowsheet Documentation  Taken 11/17/2021 1555 by Rima Fritz, RN  Pain Management Interventions:    declines    distraction  Intervention: Optimize Psychosocial Wellbeing  Recent Flowsheet Documentation  Taken 11/17/2021 1555 by Rima Fritz, RN  Diversional Activities: television     Problem: Bleeding (Surgery Nonspecified)  Goal: Absence of Bleeding  Outcome: Improving  Patient anxious about TCU options at beginning of shift, gave reassurance & updated daughter-in-law Kasey. Patient reported abdominal pain at 1/10 throughout shift, just gave scheduled Tylenol. Received OK from Dr. Cortez to change RODOLFO dressing. Stool softener held d/t multiple loose stools today. Gave incentive spirometer, up to 1900.

## 2021-11-18 NOTE — PROGRESS NOTES
ASSESSMENT:  1. Acute cholecystitis    2. RUQ abdominal pain    3. Leukocytosis, unspecified type        Nelly Alaniz is a 81 year old female who is s/p lap jeana with drain placement for purulence, on 11/15    PLAN:  -ADAT  -Continue RODOLFO drain at discharge, outpatient follow up scheduled for removal  -Anticoagulation started  -Ok to discharge from surgical standpoint once medically stable and has placement, looking like will go to TCU. Surgery orders and recs placed.    Wil Au PA-C  Pager - [phone]  Phone - [phone]   General Surgery    SUBJECTIVE:   She is feeling better, pain improving, tolerating diet, passing gas and BM    Patient Vitals for the past 24 hrs:   BP Temp Temp src Pulse Resp SpO2   11/18/21 0742 130/60 98.2  F (36.8  C) Oral 67 16 96 %   11/18/21 0039 109/58 98.6  F (37  C) Oral 64 16 93 %   11/17/21 2006 118/56 -- -- 65 -- --   11/17/21 1557 112/58 97.7  F (36.5  C) Oral 71 18 96 %   11/17/21 1113 117/62 97.8  F (36.6  C) Oral 62 18 96 %        PHYSICAL EXAM:  GEN: No acute distress, comfortable  CV:RRR  ABD:soft, nondistended, mild ttp RUQ, dressings c/d/i  Drains: RODOLFO with serous output   EXT:No cyanosis, edema or obvious abnormalities    11/17 0700 - 11/18 0659  In: 480 [P.O.:480]  Out: 42.5 [Drains:42.5]    Lab Results   Component Value Date    WBC 11.0 11/16/2021    HGB 12.1 11/16/2021    HCT 36.4 11/16/2021    MCV 97 11/16/2021     11/16/2021     INR/Prothrombin Time  Recent Labs   Lab 11/16/21  0538      CO2 26   BUN 21     Lab Results   Component Value Date    ALT 16 11/13/2021    AST 17 11/13/2021    ALKPHOS 76 11/13/2021

## 2021-11-18 NOTE — PLAN OF CARE
Problem: Adult Inpatient Plan of Care  Goal: Plan of Care Review  11/18/2021 0647 by Bessie Gomes RN  Outcome: Improving  11/18/2021 0205 by Bessie Gomes RN  Outcome: Improving  Goal: Patient-Specific Goal (Individualized)  11/18/2021 0647 by Bessie Gomes RN  Outcome: Improving  11/18/2021 0205 by Bessie Gomes RN  Outcome: Improving  Goal: Absence of Hospital-Acquired Illness or Injury  11/18/2021 0647 by Bessie Gomes RN  Outcome: Improving  11/18/2021 0205 by Bessie Gomes RN  Outcome: Improving  Intervention: Identify and Manage Fall Risk  Recent Flowsheet Documentation  Taken 11/18/2021 0039 by Bessie Gomes RN  Safety Promotion/Fall Prevention: bed alarm on  Intervention: Prevent Skin Injury  Recent Flowsheet Documentation  Taken 11/18/2021 0039 by Bessie Gomes RN  Body Position: position changed independently  Goal: Optimal Comfort and Wellbeing  11/18/2021 0647 by Bessie Gomes RN  Outcome: Improving  11/18/2021 0205 by Bessie Gomes RN  Outcome: Improving  Goal: Readiness for Transition of Care  11/18/2021 0647 by Bessie Gomes RN  Outcome: Improving  11/18/2021 0205 by Bessie Gomes RN  Outcome: Improving     Problem: Pain Acute  Goal: Acceptable Pain Control and Functional Ability  11/18/2021 0647 by Bessie Gomes RN  Outcome: Improving  11/18/2021 0205 by Bessie Gomes RN  Outcome: Improving  Intervention: Develop Pain Management Plan  Recent Flowsheet Documentation  Taken 11/18/2021 0039 by Bessie Gomes RN  Pain Management Interventions: heat applied     Problem: Risk for Delirium  Goal: Optimal Coping  11/18/2021 0647 by Bessie Gomes RN  Outcome: Improving  11/18/2021 0205 by Bessie oGmes RN  Outcome: Improving  Goal: Improved Behavioral Control  11/18/2021 0647 by Bessie Gomes RN  Outcome: Improving  11/18/2021 0205 by Bessie Gomes RN  Outcome: Improving  Goal: Improved Attention and Thought  Clarity  11/18/2021 0647 by Bessie Gomes RN  Outcome: Improving  11/18/2021 0205 by Bessie Gomes RN  Outcome: Improving  Goal: Improved Sleep  11/18/2021 0647 by Bessie Gomes RN  Outcome: Improving  11/18/2021 0205 by Bessie Gomes RN  Outcome: Improving     Problem: Bleeding (Surgery Nonspecified)  Goal: Absence of Bleeding  11/18/2021 0647 by Bessie Gomes RN  Outcome: Improving  11/18/2021 0205 by Bessie Gomes RN  Outcome: Improving     Problem: OT General Care Plan  Goal: Transfer (OT)  Description: Transfer (OT)  11/18/2021 0647 by Bessie Gomes RN  Outcome: Improving  11/18/2021 0205 by Bessie Gomes RN  Outcome: Improving  Goal: Toilet Transfer/Toileting (OT)  Description: Toilet Transfer/Toileting (OT)  11/18/2021 0647 by Bessie Gomes RN  Outcome: Improving  11/18/2021 0205 by Bessie Gomes RN  Outcome: Improving   Pt a/o with pain controlled by scheduled tylenol and heat packs. Slept well.

## 2021-11-18 NOTE — DISCHARGE SUMMARY
[facility] MEDICINE  DISCHARGE SUMMARY     Primary Care Physician: Tapan Tran  Admission Date: 11/13/2021   Discharge Provider: Fran Salinas DO, DO Discharge Date: 11/18/2021   Diet:   Active Diet and Nourishment Order   Procedures     Regular Diet Adult     Diet       Code Status: Full Code   Activity: DCACTIVITY: Activity as tolerated        Condition at Discharge: Stable     REASON FOR PRESENTATION(See Admission Note for Details)   Refer to h&p    PRINCIPAL & ACTIVE DISCHARGE DIAGNOSES     Active Problems:    Acute cholecystitis    RUQ abdominal pain    Leukocytosis, unspecified type      PENDING LABS     Unresulted Labs Ordered in the Past 30 Days of this Admission     No orders found from 10/14/2021 to 11/14/2021.            PROCEDURES ( this hospitalization only)      Procedure(s):  CHOLECYSTECTOMY, LAPAROSCOPIC    RECOMMENDATIONS TO OUTPATIENT PROVIDER FOR F/U VISIT     Follow-up Appointments     Follow Up and recommended labs and tests      Follow up with Nursing home physician.  No follow up labs or test are   needed.    [facility] general surgery, Dr. Albright or one of his team member's,   as scheduled/advised.                 DISPOSITION     TCU    SUMMARY OF HOSPITAL COURSE:    81 year old female admitted on 11/13/2021. 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  Tolerating PO, bowel function returned, pain controlled.  -Continue RODOLFO drain at discharge, outpatient follow up scheduled for removal with surgery    #History of DVT/PE  -xarelto     #Mood disorder  Continue home Topamax, Wellbutrin     #MONIKA, not on CPAP    Discharge Medications with Med changes:     Current Discharge Medication List      CONTINUE these medications which have CHANGED     Details   acetaminophen (TYLENOL) 325 MG tablet Take 2 tablets (650 mg) by mouth every 6 hours as needed for mild pain    Associated Diagnoses: Pain         CONTINUE these medications which have NOT CHANGED    Details   buPROPion (WELLBUTRIN XL) 150 MG 24 hr tablet Take 300 mg by mouth every morning       calcium carbonate 600 mg-vitamin D 400 units (CALTRATE) 600-400 MG-UNIT per tablet Take 2 tablets by mouth daily       carboxymethylcellulose PF (REFRESH PLUS) 0.5 % ophthalmic solution Place 2 drops into both eyes 4 times daily as needed for dry eyes      cetirizine (ZYRTEC) 10 MG tablet Take 10 mg by mouth daily as needed for allergies      Multiple Vitamins-Minerals (PRESERVISION AREDS) TABS Take 1 tablet by mouth 2 times daily       rivaroxaban ANTICOAGULANT (XARELTO) 20 MG TABS tablet Take 20 mg by mouth daily (with dinner)       topiramate (TOPAMAX) 50 MG tablet Take 50 mg by mouth 2 times daily                    Rationale for medication changes:              Consults       SURGERY GENERAL IP CONSULT  SOCIAL WORK IP CONSULT  SPIRITUAL HEALTH SERVICES IP CONSULT  OCCUPATIONAL THERAPY ADULT IP CONSULT  PHYSICAL THERAPY ADULT IP CONSULT  PHYSICAL THERAPY ADULT IP CONSULT  OCCUPATIONAL THERAPY ADULT IP CONSULT    Immunizations given this encounter     Most Recent Immunizations   Administered Date(s) Administered     COVID-19,PF,Pfizer (12+ Yrs) 10/15/2021     Influenza, Quad, High Dose, Pf, 65yr+ (Fluzone HD) 11/15/2021           Anticoagulation Information          SIGNIFICANT IMAGING FINDINGS     Results for orders placed or performed during the hospital encounter of 11/13/21   Abdomen US, limited (RUQ only)    Impression    IMPRESSION:  1.  Acute cholecystitis.           SIGNIFICANT LABORATORY FINDINGS         Discharge Orders        Tubes and drains    You are going home with the following tubes or drains: RODOLFO.  Tube cares per hospital or home care instructions     General info for SNF    Length of Stay  Estimate: Short Term Care: Estimated # of Days <30  Condition at Discharge: Stable  Level of care:skilled   Rehabilitation Potential: Good  Admission H&P remains valid and up-to-date: Yes  Recent Chemotherapy: N/A  Use Nursing Home Standing Orders: Yes     Mantoux instructions    Give two-step Mantoux (PPD) Per Facility Policy Yes     Follow Up and recommended labs and tests    Follow up with Nursing home physician.  No follow up labs or test are needed.    [facility] general surgery, Dr. Albright or one of his team member's, as scheduled/advised.     Reason for your hospital stay    cholecystitis     Intake and output    Every shift     Daily weights    Call Provider for weight gain of more than 2 pounds per day or 5 pounds per week.     Activity - Up ad kelle     Weight bearing status    WBAT     Full Code     Physical Therapy Adult Consult    Evaluate and treat as clinically indicated.    Reason:  weakness     Occupational Therapy Adult Consult    Evaluate and treat as clinically indicated.    Reason:  weakness     Diet    Follow this diet upon discharge: Orders Placed This Encounter      Regular Diet Adult       Examination   Physical Exam   Temp:  [97.7  F (36.5  C)-98.6  F (37  C)] 98.2  F (36.8  C)  Pulse:  [62-71] 67  Resp:  [16-18] 16  BP: (109-130)/(56-62) 130/60  SpO2:  [93 %-96 %] 96 %  Wt Readings from Last 1 Encounters:   11/15/21 82.9 kg (182 lb 11.2 oz)   gen nad  cv rrr  Lungs cta  abd bs+, mild ttp w/o rebound  Neuro a&o    Total unit/floor time 31minutes.  Time consisted of examination of patient, reviewing the record, lab results, imaging results, completing documentation.  Coordination of care 20minutes discussing  with nursing, care management teams, and Physicians involved directly in the care of this patient.  Counseling time 11minutes consisted of discharge planning.        Fran Salinas DO,   [facility]    CC:Tapan Tran

## 2021-11-18 NOTE — PROGRESS NOTES
Spoke to Adrianne HOLLIDAY of pt. To give updates on TCU. Stated [facility] can take, [facility] declined and waiting for 2 others to get back to me. Sandra Sweet RN  11/18/21    [facility] And [facility]. Aware of $50 daily room fee. Awaiting call back from Adrianne HOLLIDAY to see about transportation.

## 2021-11-18 NOTE — PLAN OF CARE
Occupational Therapy Discharge Summary    Reason for therapy discharge:    Discharged to transitional care facility.    Progress towards therapy goal(s). See goals on Care Plan in [facility] electronic health record for goal details.  Goals partially met.  Barriers to achieving goals:   discharge from facility.    Therapy recommendation(s):    Continued therapy is recommended.  Rationale/Recommendations:  Further skilled therapy in order to maximize safety/independence with functional transfers/self care tasks. .

## 2021-11-22 ENCOUNTER — LAB REQUISITION (OUTPATIENT)
Dept: LAB | Facility: CLINIC | Age: 81
End: 2021-11-22
Payer: OTHER GOVERNMENT

## 2021-11-22 DIAGNOSIS — R53.82 CHRONIC FATIGUE, UNSPECIFIED: ICD-10-CM

## 2021-11-23 ENCOUNTER — OFFICE VISIT (OUTPATIENT)
Dept: SURGERY | Facility: CLINIC | Age: 81
End: 2021-11-23
Payer: MEDICARE

## 2021-11-23 DIAGNOSIS — K81.0 ACUTE CHOLECYSTITIS: Primary | ICD-10-CM

## 2021-11-23 LAB
ANION GAP SERPL CALCULATED.3IONS-SCNC: 6 MMOL/L (ref 5–18)
BUN SERPL-MCNC: 17 MG/DL (ref 8–28)
CALCIUM SERPL-MCNC: 10.8 MG/DL (ref 8.5–10.5)
CHLORIDE BLD-SCNC: 109 MMOL/L (ref 98–107)
CO2 SERPL-SCNC: 28 MMOL/L (ref 22–31)
CREAT SERPL-MCNC: 0.78 MG/DL (ref 0.6–1.1)
ERYTHROCYTE [DISTWIDTH] IN BLOOD BY AUTOMATED COUNT: 13.8 % (ref 10–15)
FOLATE SERPL-MCNC: 6.9 NG/ML
GFR SERPL CREATININE-BSD FRML MDRD: 71 ML/MIN/1.73M2
GLUCOSE BLD-MCNC: 85 MG/DL (ref 70–125)
HCT VFR BLD AUTO: 42.1 % (ref 35–47)
HGB BLD-MCNC: 13.4 G/DL (ref 11.7–15.7)
MCH RBC QN AUTO: 32.1 PG (ref 26.5–33)
MCHC RBC AUTO-ENTMCNC: 31.8 G/DL (ref 31.5–36.5)
MCV RBC AUTO: 101 FL (ref 78–100)
PLATELET # BLD AUTO: 393 10E3/UL (ref 150–450)
POTASSIUM BLD-SCNC: 4.3 MMOL/L (ref 3.5–5)
RBC # BLD AUTO: 4.18 10E6/UL (ref 3.8–5.2)
SODIUM SERPL-SCNC: 143 MMOL/L (ref 136–145)
TSH SERPL DL<=0.005 MIU/L-ACNC: 1.52 UIU/ML (ref 0.3–5)
VIT B12 SERPL-MCNC: 451 PG/ML (ref 213–816)
WBC # BLD AUTO: 7.8 10E3/UL (ref 4–11)

## 2021-11-23 PROCEDURE — 80048 BASIC METABOLIC PNL TOTAL CA: CPT | Performed by: INTERNAL MEDICINE

## 2021-11-23 PROCEDURE — 82746 ASSAY OF FOLIC ACID SERUM: CPT | Performed by: INTERNAL MEDICINE

## 2021-11-23 PROCEDURE — 80201 ASSAY OF TOPIRAMATE: CPT | Performed by: INTERNAL MEDICINE

## 2021-11-23 PROCEDURE — 99024 POSTOP FOLLOW-UP VISIT: CPT | Performed by: PHYSICIAN ASSISTANT

## 2021-11-23 PROCEDURE — 82607 VITAMIN B-12: CPT | Performed by: INTERNAL MEDICINE

## 2021-11-23 PROCEDURE — 36415 COLL VENOUS BLD VENIPUNCTURE: CPT | Performed by: INTERNAL MEDICINE

## 2021-11-23 PROCEDURE — 84443 ASSAY THYROID STIM HORMONE: CPT | Performed by: INTERNAL MEDICINE

## 2021-11-23 PROCEDURE — 85027 COMPLETE CBC AUTOMATED: CPT | Performed by: INTERNAL MEDICINE

## 2021-11-23 PROCEDURE — P9604 ONE-WAY ALLOW PRORATED TRIP: HCPCS | Performed by: INTERNAL MEDICINE

## 2021-11-23 NOTE — LETTER
11/23/2021         RE: Nelly Alaniz  [address]  [place] [address]        Dear Colleague,    Thank you for referring your patient, Nelly Alaniz, to the [facility] [place]. Please see a copy of my visit note below.    HPI: Pt is here for follow up s/p lap jeana with drain placement with Dr. Albright on 11/15.   she is doing well.  Pain is well controlled:  Yes. No difficulties with the surgical wound/wounds.  she is eating well and denies fever and chills.      RODOLFO has had minimal output daily      There were no vitals taken for this visit.    EXAM:  GENERAL:Appears well  ABDOMEN:  Soft, +BS, RODOLFO with scant serosanguinous output, this was removed  SURGICAL WOUNDS:  Incisions healing well, no induration or drainage.      Assessment/Plan: Doing well after surgery and should follow up as needed.    Wil Au PA-C  [phone]  General Surgery           Again, thank you for allowing me to participate in the care of your patient.        Sincerely,        Wil Au PA-C     Negative

## 2021-11-24 LAB — TOPIRAMATE SERPL-MCNC: 3.9 UG/ML

## 2021-11-24 NOTE — PROGRESS NOTES
HPI: Pt is here for follow up s/p lap jeana with drain placement with Dr. Albright on 11/15.   she is doing well.  Pain is well controlled:  Yes. No difficulties with the surgical wound/wounds.  she is eating well and denies fever and chills.      RODOLFO has had minimal output daily      There were no vitals taken for this visit.    EXAM:  GENERAL:Appears well  ABDOMEN:  Soft, +BS, RODOLFO with scant serosanguinous output, this was removed  SURGICAL WOUNDS:  Incisions healing well, no induration or drainage.      Assessment/Plan: Doing well after surgery and should follow up as needed.    Wil Au PA-C  [phone]  General Surgery

## 2022-10-02 ENCOUNTER — HOSPITAL ENCOUNTER (EMERGENCY)
Facility: HOSPITAL | Age: 82
Discharge: HOME | End: 2022-10-02
Attending: EMERGENCY MEDICINE | Admitting: EMERGENCY MEDICINE
Payer: MEDICARE

## 2022-10-02 ENCOUNTER — APPOINTMENT (OUTPATIENT)
Dept: CT IMAGING | Facility: HOSPITAL | Age: 82
End: 2022-10-02
Attending: EMERGENCY MEDICINE
Payer: MEDICARE

## 2022-10-02 VITALS
OXYGEN SATURATION: 98 % | WEIGHT: 182 LBS | DIASTOLIC BLOOD PRESSURE: 72 MMHG | HEART RATE: 65 BPM | TEMPERATURE: 97.6 F | SYSTOLIC BLOOD PRESSURE: 152 MMHG | HEIGHT: 60 IN | RESPIRATION RATE: 19 BRPM | BODY MASS INDEX: 35.73 KG/M2

## 2022-10-02 DIAGNOSIS — R20.2 TINGLING IN EXTREMITIES: ICD-10-CM

## 2022-10-02 LAB
ANION GAP SERPL CALCULATED.3IONS-SCNC: 6 MMOL/L (ref 7–15)
BUN SERPL-MCNC: 23 MG/DL (ref 8–23)
CALCIUM SERPL-MCNC: 9.3 MG/DL (ref 8.8–10.2)
CHLORIDE SERPL-SCNC: 108 MMOL/L (ref 98–107)
CREAT SERPL-MCNC: 0.73 MG/DL (ref 0.51–0.95)
DEPRECATED HCO3 PLAS-SCNC: 29 MMOL/L (ref 22–29)
ERYTHROCYTE [DISTWIDTH] IN BLOOD BY AUTOMATED COUNT: 12.5 % (ref 10–15)
GFR SERPL CREATININE-BSD FRML MDRD: 81 ML/MIN/1.73M2
GLUCOSE SERPL-MCNC: 107 MG/DL (ref 70–99)
HCT VFR BLD AUTO: 40.1 % (ref 35–47)
HGB BLD-MCNC: 13.3 G/DL (ref 11.7–15.7)
MAGNESIUM SERPL-MCNC: 1.8 MG/DL (ref 1.7–2.3)
MCH RBC QN AUTO: 32.3 PG (ref 26.5–33)
MCHC RBC AUTO-ENTMCNC: 33.2 G/DL (ref 31.5–36.5)
MCV RBC AUTO: 97 FL (ref 78–100)
PLATELET # BLD AUTO: 177 10E3/UL (ref 150–450)
POTASSIUM SERPL-SCNC: 3.5 MMOL/L (ref 3.4–5.3)
RBC # BLD AUTO: 4.12 10E6/UL (ref 3.8–5.2)
SODIUM SERPL-SCNC: 143 MMOL/L (ref 136–145)
WBC # BLD AUTO: 4 10E3/UL (ref 4–11)

## 2022-10-02 PROCEDURE — 83735 ASSAY OF MAGNESIUM: CPT | Performed by: EMERGENCY MEDICINE

## 2022-10-02 PROCEDURE — 85027 COMPLETE CBC AUTOMATED: CPT | Performed by: EMERGENCY MEDICINE

## 2022-10-02 PROCEDURE — 93005 ELECTROCARDIOGRAM TRACING: CPT | Performed by: EMERGENCY MEDICINE

## 2022-10-02 PROCEDURE — G1010 CDSM STANSON: HCPCS

## 2022-10-02 PROCEDURE — 36415 COLL VENOUS BLD VENIPUNCTURE: CPT | Performed by: EMERGENCY MEDICINE

## 2022-10-02 PROCEDURE — 99285 EMERGENCY DEPT VISIT HI MDM: CPT | Mod: 25

## 2022-10-02 PROCEDURE — 80048 BASIC METABOLIC PNL TOTAL CA: CPT | Performed by: EMERGENCY MEDICINE

## 2022-10-02 ASSESSMENT — ACTIVITIES OF DAILY LIVING (ADL)
ADLS_ACUITY_SCORE: 35
ADLS_ACUITY_SCORE: 33

## 2022-10-02 ASSESSMENT — ENCOUNTER SYMPTOMS
COUGH: 0
HEADACHES: 0
FEVER: 0
CHILLS: 1
WEAKNESS: 0
SHORTNESS OF BREATH: 0
VOMITING: 0
ABDOMINAL PAIN: 0
NAUSEA: 0
NUMBNESS: 1
DIARRHEA: 0
BLOOD IN STOOL: 0

## 2022-10-02 NOTE — DISCHARGE INSTRUCTIONS
You are seen and evaluated here in the emergency department for your numbness/tingling in your hands/arms.  Did assess you for stroke with a CT scan and this was negative for any bleeding or acute signs of stroke and that is reassuring.  Your laboratory evaluation did not show any signs of anemia or electrolyte derangements that would cause the symptoms.  As they are intermittent and do tend to occur with different positioning of your spine or movements I do feel that this is likely nerve problem and could be due to a protruding disc or some type of spinal stenosis.  As you are not having any significant pain, weakness and symptoms are intermittent I do not feel further work-up is indicated here in the emergency department.  I recommend following up with your primary care provider for continued management.    If you are having any pain I recommend taking Tylenol or ibuprofen.    Return to the emergency department for any weakness, chest pain, shortness of breath, slurred speech or any other concerning symptoms.

## 2022-10-02 NOTE — ED TRIAGE NOTES
Pt has developed intermitent tingling since last night at 2100. The tingling dosent last long enough to even time it.  Pt had some neck pain yesterday.none today.  Pt states she has been over doing it as she is preparing for a wedding. No tingling now.

## 2022-10-02 NOTE — ED PROVIDER NOTES
Emergency Department Midlevel Supervisory Note     I personally saw the patient and performed a substantive portion of the visit including all aspects of the medical decision making.    ED Course:  11:20 AM Dhara Nicole PA-C staffed patient with me. I agree with their assessment and plan of management, and I will see the patient.   I met with the patient to introduce myself, gather additional history, perform my initial exam, and discuss the plan.     Brief HPI:     Nelly Alaniz is a 82 year old female who presents for evaluation of intermittent tingling in her hands and bilateral legs.  Intermittent, come and go with different positions.  Last night worsening tingling in left hand extending to elbow which concerned her it could be a heart attack or stroke.        Brief Physical Exam:  Constitutional:  Alert, in no acute distress  EYES: Conjunctivae clear  HENT:  Atraumatic, normocephalic  Respiratory:  Respirations even, unlabored, in no acute respiratory distress  Cardiovascular:  Regular rate and rhythm, good peripheral perfusion  GI: Soft, nondistended, nontender, no palpable masses, no rebound, no guarding   Musculoskeletal:  No edema. No cyanosis. Range of motion major extremities intact.    Integument: Warm, Dry, No erythema, No rash.   Neurologic:  Alert & oriented, no focal deficits noted.  CN 2-12 intact, normal motor and sensation  Psych: Normal mood and affect     MDM:  Patient presenting for evaluation of intermittent paresthesias.  She noted intermittent to her hands and feet for some time.  Toes carpal tunnel.  Last night seemed worse in her left hand extending up to her elbow.  She is concerned about secondary cause.  No falls.  No weakness.  No shortness of breath.  No chest pain.  No confusion.  EKG obtained which shows no evidence of arrhythmia or ischemia.  Initial blood pressure is elevated, improved on its own.  She is afebrile.  No neck rigidity.  Not consistent with stroke.  Labs  obtained and otherwise unremarkable.  Patient provided reassurance and encouraged close follow-up with her primary provider.       1. Tingling in extremities        Labs and Imaging:  Results for orders placed or performed during the hospital encounter of 10/02/22   Head CT w/o contrast    Impression    IMPRESSION:     1. Senescent changes and sequelae of chronic microangiopathy without acute intracranial abnormality.   CBC (+ platelets, no diff)   Result Value Ref Range    WBC Count 4.0 4.0 - 11.0 10e3/uL    RBC Count 4.12 3.80 - 5.20 10e6/uL    Hemoglobin 13.3 11.7 - 15.7 g/dL    Hematocrit 40.1 35.0 - 47.0 %    MCV 97 78 - 100 fL    MCH 32.3 26.5 - 33.0 pg    MCHC 33.2 31.5 - 36.5 g/dL    RDW 12.5 10.0 - 15.0 %    Platelet Count 177 150 - 450 10e3/uL   Basic metabolic panel   Result Value Ref Range    Sodium 143 136 - 145 mmol/L    Potassium 3.5 3.4 - 5.3 mmol/L    Chloride 108 (H) 98 - 107 mmol/L    Carbon Dioxide (CO2) 29 22 - 29 mmol/L    Anion Gap 6 (L) 7 - 15 mmol/L    Urea Nitrogen 23.0 8.0 - 23.0 mg/dL    Creatinine 0.73 0.51 - 0.95 mg/dL    Calcium 9.3 8.8 - 10.2 mg/dL    Glucose 107 (H) 70 - 99 mg/dL    GFR Estimate 81 >60 mL/min/1.73m2   Result Value Ref Range    Magnesium 1.8 1.7 - 2.3 mg/dL   ECG 12-LEAD WITH MUSE (L[facility])   Result Value Ref Range    Systolic Blood Pressure 151 mmHg    Diastolic Blood Pressure 69 mmHg    Ventricular Rate 62 BPM    Atrial Rate 62 BPM    DC Interval 206 ms    QRS Duration 96 ms     ms    QTc 436 ms    P Axis 57 degrees    R AXIS -13 degrees    T Axis 58 degrees    Interpretation ECG       Sinus rhythm  Low voltage QRS  Cannot rule out Anterior infarct , age undetermined  Abnormal ECG  When compared with ECG of 13-NOV-2021 14:26,  No significant change was found  Confirmed by SEE ED PROVIDER NOTE FOR, ECG INTERPRETATION ([id number]),  DESMOND TRAVIS ([id number]) on 10/3/2022 8:12:40 AM       I have reviewed the relevant laboratory and radiology studies    Procedures:  I  was present for the key portions of this procedure: none    Cecile Paredes DO  [facility] EMERGENCY DEPARTMENT  [facility] [facility] [address] [phone]     Cecile Paredes DO  10/06/22 0609

## 2022-10-02 NOTE — ED PROVIDER NOTES
EMERGENCY DEPARTMENT ENCOUNTER      NAME: Nelly Alaniz  AGE: 82 year old female  YOB: 1940  MRN: [id number]  EVALUATION DATE & TIME: 10/2/2022 10:50 AM    PCP: Tapan Tran    ED PROVIDER: Dhara Nicole PA-C      Chief Complaint   Patient presents with     Tingling         FINAL IMPRESSION:  1. Tingling in extremities        MEDICAL DECISION MAKING:    Pertinent Labs & Imaging studies reviewed. (See chart for details)  82 year old female presents to the Emergency Department for evaluation of numbness/tingling in her hands/arms.  Patient has been dealing with intermittent numbness/tingling in her bilateral legs and hands for the past several weeks.  Symptoms are intermittent and seem to come and go with different positions and she especially gets numbness/tingling in her hands when she is doing her lymphedema exercises.  Last night around 9 PM she started to develop numbness/tingling in both of her hands worse on the left which was going up into her forearm.  This was new for her and she was concerned so she presented to the emergency department.  On my evaluation, patient was hypertensive at 205/92 but otherwise vitally stable.  Examination was unremarkable with a normal neurologic exam, no focal deficits and otherwise no significant normalities.  Differential diagnosis included ACS, stroke, electrolyte derangement, anemia, herniated disc, carpal tunnel, spinal stenosis.    EKG in normal sinus rhythm without any ST or T wave changes concerning for ACS.  She does not have any chest pain or difficulty breathing again do not feel that troponin is necessary at this time.  CBC without any derangements.  BMP without any significant derangements.  Magnesium is normal at 1.8.  Did obtain a CT of her head to rule out stroke and this was without any acute intracranial bleeding or signs of acute infarct.  With no numbness/tingling or other symptoms at this time I do feel that this is reassuring.  At  this time, it is unclear the cause of her symptoms but I do feel that with the intermittent symptoms it might be due to some nerve issues due to may be her herniated disc, carpal tunnel, spinal stenosis, etc.  I commend she follow-up with her primary care provider especially if the symptoms continue or become consistent.  I discussed the results and plan of care with the patient and she was in agreement understanding.  She was reassured with the lab findings today and return precautions were discussed.  All questions were answered to the best my ability and she was discharged from the emergency department in stable condition.    ED COURSE:  11:02 AM I met with the patient, obtained history, performed an initial exam, and discussed options and plan for diagnostics and treatment here in the ED.    11:15 AM I staffed the patient with Cecile Paredes DO.     12:40 PM I updated the patient on her results and she was reassured.     1:00 PM Patient discharged after being provided with extensive anticipatory guidance and given return precautions, importance of PCP follow-up emphasized.    At the conclusion of the encounter I discussed the results of all of the tests and the disposition. The questions were answered. The patient or family acknowledged understanding and was agreeable with the care plan.     MEDICATIONS GIVEN IN THE EMERGENCY:  Medications - No data to display    NEW PRESCRIPTIONS STARTED AT TODAY'S ER VISIT  New Prescriptions    No medications on file            =================================================================    HPI:    Patient information was obtained from: The patient    Use of Interpretor: N/A         Nelly Alaniz is a 82 year old female with a pertinent history of lymphedema, PE on xarelto who presents to this ED via private vehicle for evaluation of tingling. The patient has been having intermittent numbness/tinlging in her legs and hands for the past several weeks to months that has  been intermittent. Her ECP thought that the numbness and tingling was due to her lymphedema as it has been improving with her improvement of the lymphedema and that the numbness/tingling in her hands has been from likely carpal tunnel as it is mostly exacerbated when she is doing her lymphedema exercises.  Last night around 9 PM, the patient started to have bilateral numbness/tingling in her hands worse on the left and extending into her forearm.  This was different than her previous numbness/tingling and she was concerned so she presented to the emergency department today.    On my evaluation, the patient reports that the numbness/tingling has since resolved and did not last very long only a few seconds at a time.  She did not have any associated pain, weakness or any other concerning symptoms.  She did not have any headaches, vision changes, speech changes, facial droop.  She has been under a lot of stress preparing for a wedding and she is wondering whether this has contributed to her symptoms along with some anxiety.  She did have some neck pain yesterdaybut this has since resolved and she did not have any symptoms in her shoulder or upper arm.  She also notes that she did get her flu vaccine on Wednesday and that night woke up with overall body aches, chills and a low-grade fever.  The symptoms have since resolved.  He is not having any fevers, chest pain, shortness of breath, cough, abdominal pain, nausea, vomiting, diarrhea, black or bloody stools, urinary symptoms or any other symptoms. No syncope or recent falls.      REVIEW OF SYSTEMS:  Review of Systems   Constitutional: Positive for chills. Negative for fever.   Eyes: Negative for visual disturbance.   Respiratory: Negative for cough and shortness of breath.    Cardiovascular: Negative for chest pain.   Gastrointestinal: Negative for abdominal pain, blood in stool, diarrhea, nausea and vomiting.   Genitourinary: Negative.    Neurological: Positive for  numbness. Negative for syncope, weakness and headaches.   All other systems reviewed and are negative.        PAST MEDICAL HISTORY:  Past Medical History:   Diagnosis Date     Depressive disorder      Obesity      Pulmonary emboli (H)        PAST SURGICAL HISTORY:  Past Surgical History:   Procedure Laterality Date     HYSTERECTOMY       LAPAROSCOPIC CHOLECYSTECTOMY N/A 11/15/2021    Procedure: CHOLECYSTECTOMY, LAPAROSCOPIC;  Surgeon: Peter Albright DO;  Location: [facility] Main OR           CURRENT MEDICATIONS:    No current facility-administered medications for this encounter.    Current Outpatient Medications:      acetaminophen (TYLENOL) 325 MG tablet, Take 2 tablets (650 mg) by mouth every 6 hours as needed for mild pain, Disp: , Rfl:      buPROPion (WELLBUTRIN XL) 150 MG 24 hr tablet, Take 300 mg by mouth every morning , Disp: , Rfl:      calcium carbonate 600 mg-vitamin D 400 units (CALTRATE) 600-400 MG-UNIT per tablet, Take 2 tablets by mouth daily , Disp: , Rfl:      carboxymethylcellulose PF (REFRESH PLUS) 0.5 % ophthalmic solution, Place 2 drops into both eyes 4 times daily as needed for dry eyes, Disp: , Rfl:      cetirizine (ZYRTEC) 10 MG tablet, Take 10 mg by mouth daily as needed for allergies, Disp: , Rfl:      Multiple Vitamins-Minerals (PRESERVISION AREDS) TABS, Take 1 tablet by mouth 2 times daily , Disp: , Rfl:      rivaroxaban ANTICOAGULANT (XARELTO) 20 MG TABS tablet, Take 20 mg by mouth daily (with dinner) , Disp: , Rfl:      topiramate (TOPAMAX) 50 MG tablet, Take 50 mg by mouth 2 times daily , Disp: , Rfl:       ALLERGIES:  No Known Allergies    FAMILY HISTORY:  Family History   Problem Relation Age of Onset     Cerebrovascular Disease Mother      Cerebrovascular Disease Maternal Grandmother      Cerebrovascular Disease Maternal Aunt        SOCIAL HISTORY:   Social History     Socioeconomic History     Marital status:    Tobacco Use     Smoking status: Never Smoker     Smokeless  tobacco: Never Used   Substance and Sexual Activity     Alcohol use: Not Currently     Drug use: Never       VITALS:  Patient Vitals for the past 24 hrs:   BP Temp Temp src Pulse Resp SpO2 Height Weight   10/02/22 1200 (!) 151/69 -- -- 64 -- 99 % -- --   10/02/22 1130 (!) 144/66 -- -- 68 -- 98 % -- --   10/02/22 1100 (!) 140/70 -- -- -- -- -- -- --   10/02/22 1046 (!) 205/92 97.6  F (36.4  C) Tympanic 82 12 97 % 1.524 m (5') 82.6 kg (182 lb)       PHYSICAL EXAM    Constitutional: Well developed, Well nourished, NAD  HENT: Normocephalic, Atraumatic, Bilateral external ears normal, Oropharynx normal, mucous membranes moist, Nose normal. No facial droop.  Neck: Normal range of motion, No tenderness, Supple, No stridor.  Eyes: PERRL, EOMI, Conjunctiva normal, No discharge.   Respiratory: Normal breath sounds, No respiratory distress, No wheezing, Speaks full sentences easily. No cough.  Cardiovascular: Normal heart rate, Regular rhythm, No rubs, No gallops.   GI: Soft, No tenderness, No masses, No flank tenderness. No rebound or guarding.  Musculoskeletal: Bilateral lower extremity lymphedema with compression stockings on and no tenderness to palpation.  No cyanosis, No clubbing. Good range of motion in all major joints. No tenderness to palpation or major deformities noted. No tenderness of the CTLS spine.   Integument: Warm, Dry, No erythema, No rash. No petechiae.  Neurologic: GCS 15. Cranial nerves II-XII grossly intact. Alert & oriented x 3, 5 out of 5 strength and sensation in bilateral upper and lower extremities.  No pronator drift.  No focal deficits noted. Normal gait.  Psychiatric: Affect normal, Judgment normal, Mood normal. Cooperative.    LAB:  All pertinent labs reviewed and interpreted.  Recent Results (from the past 24 hour(s))   CBC (+ platelets, no diff)    Collection Time: 10/02/22 12:03 PM   Result Value Ref Range    WBC Count 4.0 4.0 - 11.0 10e3/uL    RBC Count 4.12 3.80 - 5.20 10e6/uL     Hemoglobin 13.3 11.7 - 15.7 g/dL    Hematocrit 40.1 35.0 - 47.0 %    MCV 97 78 - 100 fL    MCH 32.3 26.5 - 33.0 pg    MCHC 33.2 31.5 - 36.5 g/dL    RDW 12.5 10.0 - 15.0 %    Platelet Count 177 150 - 450 10e3/uL   Basic metabolic panel    Collection Time: 10/02/22 12:03 PM   Result Value Ref Range    Sodium 143 136 - 145 mmol/L    Potassium 3.5 3.4 - 5.3 mmol/L    Chloride 108 (H) 98 - 107 mmol/L    Carbon Dioxide (CO2) 29 22 - 29 mmol/L    Anion Gap 6 (L) 7 - 15 mmol/L    Urea Nitrogen 23.0 8.0 - 23.0 mg/dL    Creatinine 0.73 0.51 - 0.95 mg/dL    Calcium 9.3 8.8 - 10.2 mg/dL    Glucose 107 (H) 70 - 99 mg/dL    GFR Estimate 81 >60 mL/min/1.73m2   Magnesium    Collection Time: 10/02/22 12:03 PM   Result Value Ref Range    Magnesium 1.8 1.7 - 2.3 mg/dL       RADIOLOGY:  Reviewed all pertinent imaging. Please see official radiology report.  Head CT w/o contrast   Final Result   IMPRESSION:       1. Senescent changes and sequelae of chronic microangiopathy without acute intracranial abnormality.          Dhara Nicole PA-C  Emergency Medicine  [facility]  10/2/2022      Dhara Nicole PA-C  10/02/22 4118

## 2022-10-03 LAB
ATRIAL RATE - MUSE: 62 BPM
DIASTOLIC BLOOD PRESSURE - MUSE: 69 MMHG
INTERPRETATION ECG - MUSE: NORMAL
P AXIS - MUSE: 57 DEGREES
PR INTERVAL - MUSE: 206 MS
QRS DURATION - MUSE: 96 MS
QT - MUSE: 430 MS
QTC - MUSE: 436 MS
R AXIS - MUSE: -13 DEGREES
SYSTOLIC BLOOD PRESSURE - MUSE: 151 MMHG
T AXIS - MUSE: 58 DEGREES
VENTRICULAR RATE- MUSE: 62 BPM
